# Patient Record
Sex: MALE | Race: WHITE | URBAN - METROPOLITAN AREA
[De-identification: names, ages, dates, MRNs, and addresses within clinical notes are randomized per-mention and may not be internally consistent; named-entity substitution may affect disease eponyms.]

---

## 2017-02-09 ENCOUNTER — TELEPHONE (OUTPATIENT)
Dept: PALLIATIVE MEDICINE | Facility: CLINIC | Age: 47
End: 2017-02-09

## 2017-02-09 NOTE — TELEPHONE ENCOUNTER
Nurse Line call 2/9/17    Patient would like a nurse to call him back related to an insurance issue. [phone]. Called him back. No answer. Will try again later.    AIME TerryN, RN  Care Coordinator  [facility] [place]

## 2017-02-13 NOTE — TELEPHONE ENCOUNTER
"Called and spoke to pt. He stated that he had been in pain for multiple years and he wanted to be \"out of pain\" and he wanted to know if we could do that or not. Explained to him that we had tried to call him in November and December and offered him the facet joint injection vs the repeat RFA.     Explained that since he had not seen provider since 11/2/2016 he really had to come in for a proper evaluation. Offered pt an appt within 2 weeks and he denied appt till 4/19/2017.     Will send to provider for FYI only. And close encounter.     Chika Florentino, RN, [facility]  [facility] Clinic Care Coordinator   "

## 2017-03-25 ENCOUNTER — APPOINTMENT (OUTPATIENT)
Dept: GENERAL RADIOLOGY | Facility: CLINIC | Age: 47
DRG: 379 | End: 2017-03-25
Attending: EMERGENCY MEDICINE
Payer: COMMERCIAL

## 2017-03-25 ENCOUNTER — HOSPITAL ENCOUNTER (INPATIENT)
Facility: CLINIC | Age: 47
LOS: 1 days | Discharge: HOME | DRG: 379 | End: 2017-03-27
Attending: EMERGENCY MEDICINE | Admitting: FAMILY MEDICINE
Payer: COMMERCIAL

## 2017-03-25 DIAGNOSIS — D50.0 IRON DEFICIENCY ANEMIA DUE TO CHRONIC BLOOD LOSS: ICD-10-CM

## 2017-03-25 DIAGNOSIS — G89.29 CHRONIC LOW BACK PAIN WITHOUT SCIATICA, UNSPECIFIED BACK PAIN LATERALITY: Chronic | ICD-10-CM

## 2017-03-25 DIAGNOSIS — R42 LIGHTHEADEDNESS: ICD-10-CM

## 2017-03-25 DIAGNOSIS — M62.81 MUSCLE WEAKNESS (GENERALIZED): ICD-10-CM

## 2017-03-25 DIAGNOSIS — D64.9 ANEMIA, UNSPECIFIED TYPE: ICD-10-CM

## 2017-03-25 DIAGNOSIS — M54.50 CHRONIC LOW BACK PAIN WITHOUT SCIATICA, UNSPECIFIED BACK PAIN LATERALITY: Chronic | ICD-10-CM

## 2017-03-25 DIAGNOSIS — K21.00 GASTROESOPHAGEAL REFLUX DISEASE WITH ESOPHAGITIS: Primary | ICD-10-CM

## 2017-03-25 DIAGNOSIS — D64.9 ANEMIA: ICD-10-CM

## 2017-03-25 PROBLEM — E87.6 HYPOKALEMIA: Status: ACTIVE | Noted: 2017-03-25

## 2017-03-25 PROBLEM — R07.9 ACUTE CHEST PAIN: Status: ACTIVE | Noted: 2017-03-25

## 2017-03-25 LAB
ALBUMIN SERPL-MCNC: 3.7 G/DL (ref 3.4–5)
ALBUMIN UR-MCNC: NEGATIVE MG/DL
ALP SERPL-CCNC: 79 U/L (ref 40–150)
ALT SERPL W P-5'-P-CCNC: 19 U/L (ref 0–70)
ANION GAP SERPL CALCULATED.3IONS-SCNC: 8 MMOL/L (ref 3–14)
APPEARANCE UR: CLEAR
AST SERPL W P-5'-P-CCNC: 10 U/L (ref 0–45)
BASOPHILS # BLD AUTO: 0.1 10E9/L (ref 0–0.2)
BASOPHILS NFR BLD AUTO: 1 %
BILIRUB SERPL-MCNC: 1.2 MG/DL (ref 0.2–1.3)
BILIRUB UR QL STRIP: NEGATIVE
BLD PROD TYP BPU: NORMAL
BLD PROD TYP BPU: NORMAL
BLD UNIT ID BPU: 0
BLD UNIT ID BPU: 0
BLOOD PRODUCT CODE: NORMAL
BLOOD PRODUCT CODE: NORMAL
BPU ID: NORMAL
BPU ID: NORMAL
BUN SERPL-MCNC: 9 MG/DL (ref 7–30)
CALCIUM SERPL-MCNC: 8.1 MG/DL (ref 8.5–10.1)
CHLORIDE SERPL-SCNC: 109 MMOL/L (ref 94–109)
CO2 SERPL-SCNC: 24 MMOL/L (ref 20–32)
COLOR UR AUTO: YELLOW
CREAT SERPL-MCNC: 0.99 MG/DL (ref 0.66–1.25)
DIFFERENTIAL METHOD BLD: ABNORMAL
EOSINOPHIL # BLD AUTO: 0 10E9/L (ref 0–0.7)
EOSINOPHIL NFR BLD AUTO: 0.3 %
ERYTHROCYTE [DISTWIDTH] IN BLOOD BY AUTOMATED COUNT: 16.5 % (ref 10–15)
GFR SERPL CREATININE-BSD FRML MDRD: 81 ML/MIN/1.7M2
GLUCOSE SERPL-MCNC: 73 MG/DL (ref 70–99)
GLUCOSE UR STRIP-MCNC: NEGATIVE MG/DL
HCT VFR BLD AUTO: 24.3 % (ref 40–53)
HGB BLD-MCNC: 6.7 G/DL (ref 13.3–17.7)
HGB BLD-MCNC: 6.8 G/DL (ref 13.3–17.7)
HGB UR QL STRIP: NEGATIVE
IMM GRANULOCYTES # BLD: 0 10E9/L (ref 0–0.4)
IMM GRANULOCYTES NFR BLD: 0.3 %
KETONES UR STRIP-MCNC: NEGATIVE MG/DL
LACTATE BLD-SCNC: 1.5 MMOL/L (ref 0.7–2.1)
LEUKOCYTE ESTERASE UR QL STRIP: NEGATIVE
LIPASE SERPL-CCNC: 94 U/L (ref 73–393)
LYMPHOCYTES # BLD AUTO: 0.8 10E9/L (ref 0.8–5.3)
LYMPHOCYTES NFR BLD AUTO: 13.6 %
MCH RBC QN AUTO: 18.2 PG (ref 26.5–33)
MCHC RBC AUTO-ENTMCNC: 28 G/DL (ref 31.5–36.5)
MCV RBC AUTO: 65 FL (ref 78–100)
MONOCYTES # BLD AUTO: 0.5 10E9/L (ref 0–1.3)
MONOCYTES NFR BLD AUTO: 8 %
NEUTROPHILS # BLD AUTO: 4.4 10E9/L (ref 1.6–8.3)
NEUTROPHILS NFR BLD AUTO: 76.8 %
NITRATE UR QL: NEGATIVE
PH UR STRIP: 7 PH (ref 5–7)
PLATELET # BLD AUTO: 310 10E9/L (ref 150–450)
POTASSIUM SERPL-SCNC: 3.2 MMOL/L (ref 3.4–5.3)
PROT SERPL-MCNC: 6.9 G/DL (ref 6.8–8.8)
RBC # BLD AUTO: 3.73 10E12/L (ref 4.4–5.9)
SODIUM SERPL-SCNC: 141 MMOL/L (ref 133–144)
SP GR UR STRIP: 1.01 (ref 1–1.03)
TRANSFUSION STATUS PATIENT QL: NORMAL
TROPONIN I SERPL-MCNC: NORMAL UG/L (ref 0–0.04)
URN SPEC COLLECT METH UR: NORMAL
UROBILINOGEN UR STRIP-MCNC: NORMAL MG/DL (ref 0–2)
WBC # BLD AUTO: 5.7 10E9/L (ref 4–11)

## 2017-03-25 PROCEDURE — 36430 TRANSFUSION BLD/BLD COMPNT: CPT

## 2017-03-25 PROCEDURE — 96360 HYDRATION IV INFUSION INIT: CPT

## 2017-03-25 PROCEDURE — 99220 ZZC INITIAL OBSERVATION CARE,LEVL III: CPT | Performed by: FAMILY MEDICINE

## 2017-03-25 PROCEDURE — 99285 EMERGENCY DEPT VISIT HI MDM: CPT | Mod: 25 | Performed by: EMERGENCY MEDICINE

## 2017-03-25 PROCEDURE — 86923 COMPATIBILITY TEST ELECTRIC: CPT | Performed by: EMERGENCY MEDICINE

## 2017-03-25 PROCEDURE — P9016 RBC LEUKOCYTES REDUCED: HCPCS | Performed by: EMERGENCY MEDICINE

## 2017-03-25 PROCEDURE — 80053 COMPREHEN METABOLIC PANEL: CPT | Performed by: FAMILY MEDICINE

## 2017-03-25 PROCEDURE — 85025 COMPLETE CBC W/AUTO DIFF WBC: CPT | Performed by: FAMILY MEDICINE

## 2017-03-25 PROCEDURE — 96361 HYDRATE IV INFUSION ADD-ON: CPT

## 2017-03-25 PROCEDURE — 86901 BLOOD TYPING SEROLOGIC RH(D): CPT | Performed by: EMERGENCY MEDICINE

## 2017-03-25 PROCEDURE — 25000132 ZZH RX MED GY IP 250 OP 250 PS 637: Performed by: EMERGENCY MEDICINE

## 2017-03-25 PROCEDURE — 85018 HEMOGLOBIN: CPT | Performed by: EMERGENCY MEDICINE

## 2017-03-25 PROCEDURE — 83690 ASSAY OF LIPASE: CPT | Performed by: FAMILY MEDICINE

## 2017-03-25 PROCEDURE — 86900 BLOOD TYPING SEROLOGIC ABO: CPT | Performed by: EMERGENCY MEDICINE

## 2017-03-25 PROCEDURE — 99285 EMERGENCY DEPT VISIT HI MDM: CPT | Mod: 25

## 2017-03-25 PROCEDURE — 96374 THER/PROPH/DIAG INJ IV PUSH: CPT

## 2017-03-25 PROCEDURE — 25000125 ZZHC RX 250: Performed by: FAMILY MEDICINE

## 2017-03-25 PROCEDURE — 99207 ZZC CDG-MDM COMPONENT: MEETS HIGH - UP CODED: CPT | Performed by: FAMILY MEDICINE

## 2017-03-25 PROCEDURE — 93010 ELECTROCARDIOGRAM REPORT: CPT | Performed by: EMERGENCY MEDICINE

## 2017-03-25 PROCEDURE — 83605 ASSAY OF LACTIC ACID: CPT | Performed by: EMERGENCY MEDICINE

## 2017-03-25 PROCEDURE — G0378 HOSPITAL OBSERVATION PER HR: HCPCS

## 2017-03-25 PROCEDURE — 71020 XR CHEST 2 VW: CPT

## 2017-03-25 PROCEDURE — 81003 URINALYSIS AUTO W/O SCOPE: CPT | Performed by: EMERGENCY MEDICINE

## 2017-03-25 PROCEDURE — 86850 RBC ANTIBODY SCREEN: CPT | Performed by: EMERGENCY MEDICINE

## 2017-03-25 PROCEDURE — 93005 ELECTROCARDIOGRAM TRACING: CPT

## 2017-03-25 PROCEDURE — 25000132 ZZH RX MED GY IP 250 OP 250 PS 637: Performed by: FAMILY MEDICINE

## 2017-03-25 PROCEDURE — 84484 ASSAY OF TROPONIN QUANT: CPT | Performed by: FAMILY MEDICINE

## 2017-03-25 PROCEDURE — 25000128 H RX IP 250 OP 636: Performed by: EMERGENCY MEDICINE

## 2017-03-25 RX ORDER — HYDROCODONE BITARTRATE AND ACETAMINOPHEN 5; 325 MG/1; MG/1
1-2 TABLET ORAL EVERY 4 HOURS PRN
Status: DISCONTINUED | OUTPATIENT
Start: 2017-03-25 | End: 2017-03-27 | Stop reason: HOSPADM

## 2017-03-25 RX ORDER — POTASSIUM CHLORIDE 1500 MG/1
20-40 TABLET, EXTENDED RELEASE ORAL
Status: DISCONTINUED | OUTPATIENT
Start: 2017-03-25 | End: 2017-03-27 | Stop reason: HOSPADM

## 2017-03-25 RX ORDER — POTASSIUM CHLORIDE 29.8 MG/ML
20 INJECTION INTRAVENOUS
Status: DISCONTINUED | OUTPATIENT
Start: 2017-03-25 | End: 2017-03-25 | Stop reason: ALTCHOICE

## 2017-03-25 RX ORDER — SODIUM CHLORIDE 9 MG/ML
INJECTION, SOLUTION INTRAVENOUS CONTINUOUS
Status: DISCONTINUED | OUTPATIENT
Start: 2017-03-25 | End: 2017-03-26 | Stop reason: DRUGHIGH

## 2017-03-25 RX ORDER — ACETAMINOPHEN 325 MG/1
650 TABLET ORAL EVERY 6 HOURS PRN
Status: DISCONTINUED | OUTPATIENT
Start: 2017-03-25 | End: 2017-03-27 | Stop reason: HOSPADM

## 2017-03-25 RX ORDER — ONDANSETRON 4 MG/1
4 TABLET, ORALLY DISINTEGRATING ORAL EVERY 6 HOURS PRN
Status: DISCONTINUED | OUTPATIENT
Start: 2017-03-25 | End: 2017-03-27 | Stop reason: HOSPADM

## 2017-03-25 RX ORDER — HYDROMORPHONE HYDROCHLORIDE 1 MG/ML
.3-.5 INJECTION, SOLUTION INTRAMUSCULAR; INTRAVENOUS; SUBCUTANEOUS
Status: DISCONTINUED | OUTPATIENT
Start: 2017-03-25 | End: 2017-03-27 | Stop reason: HOSPADM

## 2017-03-25 RX ORDER — SODIUM CHLORIDE 9 MG/ML
INJECTION, SOLUTION INTRAVENOUS CONTINUOUS
Status: DISCONTINUED | OUTPATIENT
Start: 2017-03-25 | End: 2017-03-27 | Stop reason: HOSPADM

## 2017-03-25 RX ORDER — POTASSIUM CHLORIDE 7.45 MG/ML
10 INJECTION INTRAVENOUS
Status: DISCONTINUED | OUTPATIENT
Start: 2017-03-25 | End: 2017-03-27 | Stop reason: HOSPADM

## 2017-03-25 RX ORDER — POTASSIUM CHLORIDE 1.5 G/1.58G
20-40 POWDER, FOR SOLUTION ORAL
Status: DISCONTINUED | OUTPATIENT
Start: 2017-03-25 | End: 2017-03-27 | Stop reason: HOSPADM

## 2017-03-25 RX ORDER — ACETAMINOPHEN 325 MG/1
650 TABLET ORAL EVERY 4 HOURS PRN
Status: DISCONTINUED | OUTPATIENT
Start: 2017-03-25 | End: 2017-03-25

## 2017-03-25 RX ORDER — ONDANSETRON 2 MG/ML
4 INJECTION INTRAMUSCULAR; INTRAVENOUS EVERY 6 HOURS PRN
Status: DISCONTINUED | OUTPATIENT
Start: 2017-03-25 | End: 2017-03-27 | Stop reason: HOSPADM

## 2017-03-25 RX ORDER — NALOXONE HYDROCHLORIDE 0.4 MG/ML
.1-.4 INJECTION, SOLUTION INTRAMUSCULAR; INTRAVENOUS; SUBCUTANEOUS
Status: DISCONTINUED | OUTPATIENT
Start: 2017-03-25 | End: 2017-03-27 | Stop reason: HOSPADM

## 2017-03-25 RX ADMIN — POTASSIUM CHLORIDE 40 MEQ: 1.5 POWDER, FOR SOLUTION ORAL at 18:02

## 2017-03-25 RX ADMIN — SODIUM CHLORIDE: 9 INJECTION, SOLUTION INTRAVENOUS at 14:09

## 2017-03-25 RX ADMIN — PANTOPRAZOLE SODIUM 40 MG: 40 INJECTION, POWDER, FOR SOLUTION INTRAVENOUS at 17:59

## 2017-03-25 RX ADMIN — SODIUM CHLORIDE: 9 INJECTION, SOLUTION INTRAVENOUS at 21:03

## 2017-03-25 RX ADMIN — HYDROCODONE BITARTRATE AND ACETAMINOPHEN 1 TABLET: 5; 325 TABLET ORAL at 21:08

## 2017-03-25 RX ADMIN — POTASSIUM CHLORIDE 40 MEQ: 1.5 POWDER, FOR SOLUTION ORAL at 20:15

## 2017-03-25 ASSESSMENT — ENCOUNTER SYMPTOMS
SHORTNESS OF BREATH: 0
LIGHT-HEADEDNESS: 1
NECK PAIN: 0
ADENOPATHY: 0
HEADACHES: 0
COUGH: 0
NUMBNESS: 0
CONFUSION: 0
NAUSEA: 0
FATIGUE: 1
DYSURIA: 0
DIZZINESS: 1
DIARRHEA: 0
BLOOD IN STOOL: 0
ACTIVITY CHANGE: 1
CHILLS: 0
BACK PAIN: 0
VOMITING: 0
WEAKNESS: 0
PALPITATIONS: 0

## 2017-03-25 NOTE — ED NOTES
Pt reports chest pain for years.   Current complaints dizziness, sob, chest pain and yellowing of skin.   Pt reports has not seen a doctor in a couple of years.  Pt had a blood test at work last year and was told to follow up with doctor regarding a blood test but did not follow up.

## 2017-03-25 NOTE — IP AVS SNAPSHOT
[facility]    [address] [address]    Phone:  [phone]    Fax:  [phone]                                       After Visit Summary   3/25/2017    Mando Houston    MRN: [id number]           After Visit Summary Signature Page     I have received my discharge instructions, and my questions have been answered. I have discussed any challenges I see with this plan with the nurse or doctor.    ..........................................................................................................................................  Patient/Patient Representative Signature      ..........................................................................................................................................  Patient Representative Print Name and Relationship to Patient    ..................................................               ................................................  Date                                            Time    ..........................................................................................................................................  Reviewed by Signature/Title    ...................................................              ..............................................  Date                                                            Time

## 2017-03-25 NOTE — LETTER
[facility]    Return to Work Release    Date: 3/27/2017      Name: Mando Houston                       YOB: 1970  Soc.Sec.No: [id number]  Medical Record Number: [id number]    The patient was admitted to [facility].    Restrictions if any: none    Resume Activity: OK to return to work 3/28/2017        __________Radha Nino MD  Hospitalist_______________

## 2017-03-25 NOTE — H&P
[facility] HISTORY & PHYSICAL  Home Clinic:   [facility]  Primary Provider:   unknown  Date of admission:   3/25/2017    ASSESSMENT AND PLAN:    Principal Problem:    Iron deficiency anemia    Assessment: hgb 6.8, probable upper GI bleed chronic and intermittent.  Complains of frequent bloody emesis with acidic and fatty foods usually about 3 in the morning.  Does not take a proton pump inhibitor.  Uses Ibuprofen often for chronic back pain, has recently switched to tylenol.  One or two beer a week, no tobacco or caffeine.  Potassium is also low and will correct per standing orders.    Plan: transfuse 2 units of PRBC, goal hgb above 8.0, may try for 9.0 if still lightheaded or chest pain.    Active Problems:    Acute chest pain    Lightheadedness    Assessment: intermittent, related to anemia, troponin and EKG are normal.     Plan: as listed above      Gastroesophageal reflux disease with esophagitis    Assessment: probable cause of anemia, expect peptic ulcer or other as source of bleed and anemia.    Plan: start on PPI and stop ibuprofen. Plan outpatient gastroscopy.       Hypokalemia     Assessment:  Potassium in 3.2      Plan:  Potassium replacement per standing orders.  Disposition: Anticipate will be hospitalized 24 hours.   Code Status: CPR  GI Prophylaxis: Protonix 40 mg IVPB daily  DVT Prophylaxis: VTE Prophylaxis contraindicated due to GI bleed      CHIEF COMPLAINT:  Lightheaded, chest pain  History Obtained From:  patient, electronic health record, emergency department physician and patient's spouse    HISTORY OF PRESENT ILLNESS:    Mando Houston is a 47 year old white male with significant past medical history of GERD, anemia, chronic low back pain, obstructive sleep apnea  who presents with fatigue and dizziness for about a month after having a URI.  Light activity and work have been more difficult because of weakness.  He has had intermittent chest pain, frequent hematemesis and heartburn.  He  does not take medication for this, does use considerable amount of ibuprofen for low back pain.  No blood in stool or urine, does not bruise easily.  He has history of alcohol use but has not had drink for at least 2 months.  Hgb noted to be 6.8 and blood transfusion started in ED.    REVIEW OF SYSTEMS:  CONSTITUTIONAL:  positive for  fatigue and malaise  negative for  fevers, chills, sweats and weight loss  HEENT:  negative for  earaches, sore mouth, sore throat and hoarseness  RESPIRATORY:  negative for  dry cough, cough with sputum, dyspnea, wheezing, hemoptysis, chest pain, pleuritic pain and cyanosis  CARDIOVASCULAR:  positive for  chest pain, fatigue  negative for  palpitations, orthopnea, exertional chest pressure/discomfort, edema, syncope  GASTROINTESTINAL:  positive for vomiting, reflux and hematemesis  negative for change in bowel habits, diarrhea, constipation, abdominal pain, regurgitation and hemtochezia  GENITOURINARY:  negative for frequency, dysuria, nocturia, urinary incontinence, hesitancy, decreased stream and hematuria  MUSCULOSKELETAL:  positive for  myalgias and arthralgias  negative for  joint swelling, stiff joints, muscle weakness and bone pain  NEUROLOGICAL:  positive for dizziness and memory problems  negative for headaches, speech problems, visual disturbance, coordination problems, gait problems, tremor, weakness and numbness  BEHAVIOR/PSYCH:  negative for poor appetite, increased appetite, decreased sleep, increased sleep, decreased energy level, increased energy level, depressed mood and elated mood    PAST MEDICAL HISTORY:    Medications Prior to Admission:    Prescriptions Prior to Admission   Medication Sig Dispense Refill Last Dose     Acetaminophen (TYLENOL PO) Take 650 mg by mouth every 6 hours as needed for mild pain or fever   3/25/2017 at am     ASPIRIN NOT PRESCRIBED, INTENTIONAL, 1 each continuous prn for other Antiplatelet medication not prescribed intentionally due to GI  Issues / Ulcer Disease 0 each 0 Taking     ORDER FOR DME, SET TO LOCAL PRINT, Respironics REMSTAR 60 Series AutoCPAP 8-16cm H2O, Mirage Fx standard nasal mask   More than a month at Unknown time       Allergies:  No Known Allergies  Patient Active Problem List   Diagnosis     CARDIOVASCULAR SCREENING; LDL GOAL LESS THAN 160     Chronic low back pain     Esophageal reflux     Grief reaction     Alcohol abuse     Iron deficiency anemia     Chronic neck pain     Obesity     AMELIE (obstructive sleep apnea)- severe ()     Acute chest pain     Lightheadedness     Gastroesophageal reflux disease with esophagitis     Hypokalemia     Past Medical History:   Diagnosis Date     AR (allergic rhinitis)     CONJUNCTIVITIS     MEDICAL HISTORY OF -     Hx OF BROCHOSPASM     Past Surgical History:    Past Surgical History:   Procedure Laterality Date     PE TUBES  1970s    as child     SURGICAL HISTORY OF -   1990s    LIPOMA, CHEST     TONSILLECTOMY  1980       Social History:   Social History     Social History     Marital status: Single     Spouse name: N/A     Number of children: N/A     Years of education: N/A     Occupational History      [employer]     Social History Main Topics     Smoking status: Never Smoker     Smokeless tobacco: Never Used      Comment: non-smoking household     Alcohol use Yes      Comment: rare as of 01/05/15     Drug use: No     Sexual activity: Yes     Partners: Female     Other Topics Concern     Parent/Sibling W/ Cabg, Mi Or Angioplasty Before 65f 55m? No     Social History Narrative     Family History:   Family History   Problem Relation Age of Onset     HEART DISEASE Mother      CHF     Unknown/Adopted Father      Unknown/Adopted Maternal Grandmother      Unknown/Adopted Maternal Grandfather      Unknown/Adopted Paternal Grandmother      Unknown/Adopted Paternal Grandfather      DIABETES No family hx of      Coronary Artery Disease No family hx of      Cancer -  "colorectal No family hx of      Family Status   Relation Status     Mother      Father Alive     Maternal Grandmother      Maternal Grandfather      Paternal Grandmother      Paternal Grandfather      Brother Alive     Sister Alive     No family hx of          PHYSICAL EXAM:  Vitals:   /73 (BP Location: Left arm)  Pulse 75  Temp 97.5  F (36.4  C) (Oral)  Resp 16  Ht 1.753 m (5' 9\")  Wt 106.3 kg (234 lb 5.6 oz)  SpO2 100%  BMI 34.61 kg/m2   GENERAL APPEARANCE ADULT: alert, fit appearing, appears ill, no distress, cooperative, pale, sallow complexion, tired appearing, wife present in room  HENT: Ears and TMs normal, oral mucosa and posterior oropharynx normal  RESP: lungs clear to auscultation   CV: normal rate, regular rhythm, no murmur or gallop  ABDOMEN: soft, no organomegaly, masses or tenderness  SKIN: no suspicious lesions or rashes  NEURO: Alert, oriented, speech and mentation normal  PSYCH: mentation appears normal., affect and mood normal    ECG:   Normal Sinus Rhythm, rate 76, normal axis, normal intervals, no acute ST/T changes c/w ischemia    LABS AND IMAGING:     Results for orders placed or performed during the hospital encounter of 17   Chest XR,  PA & LAT    Narrative    CHEST TWO VIEWS 2017 1:18 PM     HISTORY: Shortness of breath.    COMPARISON: Chest x-rays dated 1/15/2015.    FINDINGS: The lungs are clear. No pleural effusions or pneumothorax.  Heart size and pulmonary vascularity are within normal limits. No  acute fracture.       Impression    IMPRESSION: No evidence of acute cardiopulmonary disease is seen.    LETICIA RUCKER MD   CBC with platelets differential   Result Value Ref Range    WBC 5.7 4.0 - 11.0 10e9/L    RBC Count 3.73 (L) 4.4 - 5.9 10e12/L    Hemoglobin 6.8 (LL) 13.3 - 17.7 g/dL    Hematocrit 24.3 (L) 40.0 - 53.0 %    MCV 65 (L) 78 - 100 fl    MCH 18.2 (L) 26.5 - 33.0 pg    MCHC 28.0 (L) 31.5 - 36.5 g/dL    RDW " 16.5 (H) 10.0 - 15.0 %    Platelet Count 310 150 - 450 10e9/L    Diff Method Automated Method     % Neutrophils 76.8 %    % Lymphocytes 13.6 %    % Monocytes 8.0 %    % Eosinophils 0.3 %    % Basophils 1.0 %    % Immature Granulocytes 0.3 %    Absolute Neutrophil 4.4 1.6 - 8.3 10e9/L    Absolute Lymphocytes 0.8 0.8 - 5.3 10e9/L    Absolute Monocytes 0.5 0.0 - 1.3 10e9/L    Absolute Eosinophils 0.0 0.0 - 0.7 10e9/L    Absolute Basophils 0.1 0.0 - 0.2 10e9/L    Abs Immature Granulocytes 0.0 0 - 0.4 10e9/L   Comprehensive metabolic panel   Result Value Ref Range    Sodium 141 133 - 144 mmol/L    Potassium 3.2 (L) 3.4 - 5.3 mmol/L    Chloride 109 94 - 109 mmol/L    Carbon Dioxide 24 20 - 32 mmol/L    Anion Gap 8 3 - 14 mmol/L    Glucose 73 70 - 99 mg/dL    Urea Nitrogen 9 7 - 30 mg/dL    Creatinine 0.99 0.66 - 1.25 mg/dL    GFR Estimate 81 >60 mL/min/1.7m2    GFR Estimate If Black >90   GFR Calc   >60 mL/min/1.7m2    Calcium 8.1 (L) 8.5 - 10.1 mg/dL    Bilirubin Total 1.2 0.2 - 1.3 mg/dL    Albumin 3.7 3.4 - 5.0 g/dL    Protein Total 6.9 6.8 - 8.8 g/dL    Alkaline Phosphatase 79 40 - 150 U/L    ALT 19 0 - 70 U/L    AST 10 0 - 45 U/L   Troponin I   Result Value Ref Range    Troponin I ES  0.000 - 0.045 ug/L     <0.015  The 99th percentile for upper reference range is 0.045 ug/L.  Troponin values in   the range of 0.045 - 0.120 ug/L may be associated with risks of adverse   clinical events.     Hemaglobin   Result Value Ref Range    Hemoglobin 6.7 (LL) 13.3 - 17.7 g/dL   Lactic acid whole blood   Result Value Ref Range    Lactic Acid 1.5 0.7 - 2.1 mmol/L   UA reflex to Microscopic   Result Value Ref Range    Color Urine Yellow     Appearance Urine Clear     Glucose Urine Negative NEG mg/dL    Bilirubin Urine Negative NEG    Ketones Urine Negative NEG mg/dL    Specific Gravity Urine 1.009 1.003 - 1.035    Blood Urine Negative NEG    pH Urine 7.0 5.0 - 7.0 pH    Protein Albumin Urine Negative NEG mg/dL     Urobilinogen mg/dL Normal 0.0 - 2.0 mg/dL    Nitrite Urine Negative NEG    Leukocyte Esterase Urine Negative NEG    Source Midstream Urine    Lipase   Result Value Ref Range    Lipase 94 73 - 393 U/L   ABO/Rh type and screen   Result Value Ref Range    Units Ordered 2     ABO A     RH(D)  Pos     Antibody Screen Neg     Test Valid Only At [facility]     Specimen Expires 03/28/2017     Crossmatch Red Blood Cells    Blood component   Result Value Ref Range    Unit Number [id number]     Blood Component Type Red Blood Cells Leukocyte Reduced     Division Number 00     Status of Unit Released to care unit 03/25/2017 1506     Blood Product Code [id number]     Unit Status ISS    Blood component   Result Value Ref Range    Unit Number [id number]     Blood Component Type Red Blood Cells Leukocyte Reduced     Division Number 00     Status of Unit Ready for patient 03/25/2017 1401     Blood Product Code [id number]     Unit Status URI        ATTESTATION:  I have reviewed today's vital signs, notes, medications, labs and imaging.  Amount of time performed on this history and physical: 70 minutes.    ABRAHAN MCCRARY MD

## 2017-03-25 NOTE — ED PROVIDER NOTES
History     Chief Complaint   Patient presents with     Chest Pain     CP OFF AND ON X 3 DAYS - LIGHTHEADED AND DIZZY TODAY     HPI  Mando Houston is a 47 year old male who has a history of chronic low back pain, GERD, AMELIE, anemia, and alcohol abuse who is here in the ED for evaluation of chest pain. Patient states that he has been feeling very dizzy and fatigued in the last month, all starting after he had a mild cold. He states that he will get worn out at work rather easy, and he can become very light headed with mild activity. Patient also states that he has been having intermittent bilateral chest pains that are irregular in length and can be characterized as sharp or dull. Patient did have a health check up at work last fall that showed that his hemoglobin was on the lower side, but he has not been seen by the doctor for it. He has not noticed his stools being bloody or dark in recent months. Patient has been taking pain medications for his chronic back pain, but he is on no other medications. He denies cough, nausea, vomiting, diarrhea, dark or bloody stools, and recent alcohol abuse.     Patient Active Problem List   Diagnosis     CARDIOVASCULAR SCREENING; LDL GOAL LESS THAN 160     Chronic low back pain     Esophageal reflux     Grief reaction     Alcohol abuse     Iron deficiency anemia     Chronic neck pain     Obesity     AMELIE (obstructive sleep apnea)- severe ()     Current Outpatient Prescriptions   Medication Sig Dispense Refill     Acetaminophen (TYLENOL PO) Take 650 mg by mouth every 6 hours as needed for mild pain or fever       ASPIRIN NOT PRESCRIBED, INTENTIONAL, 1 each continuous prn for other Antiplatelet medication not prescribed intentionally due to GI Issues / Ulcer Disease 0 each 0     ORDER FOR DME, SET TO LOCAL PRINT, Respironics REMSTAR 60 Series AutoCPAP 8-16cm H2O, Mirage Fx standard nasal mask       No Known Allergies    I have reviewed the Medications, Allergies, Past Medical  and Surgical History, and Social History in the Epic system.    Review of Systems   Constitutional: Positive for activity change and fatigue. Negative for chills.   HENT: Negative for congestion.    Eyes: Negative for visual disturbance.   Respiratory: Negative for cough and shortness of breath.    Cardiovascular: Positive for chest pain. Negative for palpitations and leg swelling.   Gastrointestinal: Negative for blood in stool, diarrhea, nausea and vomiting.   Genitourinary: Negative for decreased urine volume and dysuria.   Musculoskeletal: Negative for back pain and neck pain.   Skin: Positive for pallor. Negative for rash.   Neurological: Positive for dizziness and light-headedness. Negative for weakness, numbness and headaches.   Hematological: Negative for adenopathy.   Psychiatric/Behavioral: Negative for confusion.       Physical Exam   BP: 137/85  Heart Rate: 95  Temp: 98  F (36.7  C)  Resp: 18  Weight: 106.6 kg (235 lb)  SpO2: 99 %  Physical Exam  HENT: Oral mucosa moist. No lesions.  Neck: Supple  Pulmonary/Chest: Lungs are clear to auscultation bilaterally.  Cardiovascular: Heart is regular rate and rhythm. No murmur.  Abdomen: Soft, non-distended, non-tender.   Musculoskeletal: Moving all extremities well. No peripheral edema. No calf tenderness.   Neurological: Alert. No focal neurologic deficit.   Skin: No rash. Pale.     ED Course     ED Course     Procedures             EKG Interpretation:      Interpreted by Ok Steele  Rhythm: normal sinus   Rate: normal  Axis: normal  Ectopy: none  Conduction: normal  ST Segments/ T Waves: No ST-T wave changes  Q Waves: none  Comparison to prior: Unchanged from 1/5/15    Clinical Impression: normal EKG          Critical Care time:  none               Labs Ordered and Resulted from Time of ED Arrival Up to the Time of Departure from the ED   CBC WITH PLATELETS DIFFERENTIAL   COMPREHENSIVE METABOLIC PANEL   TROPONIN I     Results for orders placed or  performed during the hospital encounter of 03/25/17 (from the past 24 hour(s))   CBC with platelets differential   Result Value Ref Range    WBC 5.7 4.0 - 11.0 10e9/L    RBC Count 3.73 (L) 4.4 - 5.9 10e12/L    Hemoglobin 6.8 (LL) 13.3 - 17.7 g/dL    Hematocrit 24.3 (L) 40.0 - 53.0 %    MCV 65 (L) 78 - 100 fl    MCH 18.2 (L) 26.5 - 33.0 pg    MCHC 28.0 (L) 31.5 - 36.5 g/dL    RDW 16.5 (H) 10.0 - 15.0 %    Platelet Count 310 150 - 450 10e9/L    Diff Method Automated Method     % Neutrophils 76.8 %    % Lymphocytes 13.6 %    % Monocytes 8.0 %    % Eosinophils 0.3 %    % Basophils 1.0 %    % Immature Granulocytes 0.3 %    Absolute Neutrophil 4.4 1.6 - 8.3 10e9/L    Absolute Lymphocytes 0.8 0.8 - 5.3 10e9/L    Absolute Monocytes 0.5 0.0 - 1.3 10e9/L    Absolute Eosinophils 0.0 0.0 - 0.7 10e9/L    Absolute Basophils 0.1 0.0 - 0.2 10e9/L    Abs Immature Granulocytes 0.0 0 - 0.4 10e9/L   Comprehensive metabolic panel   Result Value Ref Range    Sodium 141 133 - 144 mmol/L    Potassium 3.2 (L) 3.4 - 5.3 mmol/L    Chloride 109 94 - 109 mmol/L    Carbon Dioxide 24 20 - 32 mmol/L    Anion Gap 8 3 - 14 mmol/L    Glucose 73 70 - 99 mg/dL    Urea Nitrogen 9 7 - 30 mg/dL    Creatinine 0.99 0.66 - 1.25 mg/dL    GFR Estimate 81 >60 mL/min/1.7m2    GFR Estimate If Black >90   GFR Calc   >60 mL/min/1.7m2    Calcium 8.1 (L) 8.5 - 10.1 mg/dL    Bilirubin Total 1.2 0.2 - 1.3 mg/dL    Albumin 3.7 3.4 - 5.0 g/dL    Protein Total 6.9 6.8 - 8.8 g/dL    Alkaline Phosphatase 79 40 - 150 U/L    ALT 19 0 - 70 U/L    AST 10 0 - 45 U/L   Troponin I   Result Value Ref Range    Troponin I ES  0.000 - 0.045 ug/L     <0.015  The 99th percentile for upper reference range is 0.045 ug/L.  Troponin values in   the range of 0.045 - 0.120 ug/L may be associated with risks of adverse   clinical events.         Medications   0.9% sodium chloride infusion (not administered)     Results for orders placed or performed during the hospital encounter  of 03/25/17   Chest XR,  PA & LAT    Narrative    CHEST TWO VIEWS March 25, 2017 1:18 PM     HISTORY: Shortness of breath.    COMPARISON: Chest x-rays dated 1/15/2015.    FINDINGS: The lungs are clear. No pleural effusions or pneumothorax.  Heart size and pulmonary vascularity are within normal limits. No  acute fracture.       Impression    IMPRESSION: No evidence of acute cardiopulmonary disease is seen.    LETICIA RUCKER MD       12:35 PM Patient assessed.   Assessments & Plan (with Medical Decision Making) records were reviewed.  Labs EKG and chest x-ray were obtained.  Patient's white count was not elevated but hemoglobin was 6.8 with a MCV of low at 65 MCHC was also low.  Platelet count was 310.  There was no left shift.  Comprehensive metabolic panel significant for a potassium at 3.2.  His troponin was negative.  Lactic acid 1.5 .  A repeat hemoglobin was checked and was 6.7.  Patient's last hemoglobin 2 years ago was 11.4.  He denies any dark stools.  I did a rectal exam which had minimal stool in the vault but was heme negative.  No dark stool was noted.  Chest x-ray was unremarkable.  Due to patient's generalized weakness chest pain or shortness of breath or feel his anemia is symptomatic and patient will benefit from transfusion.  I discussed with him and he is in agreement with this.  He will be admitted for transfusion.  Patient has complained of some reflux symptoms also and may have peptic ulcer disease causing some of his anemia.  Patient may need upper endoscopy.  A better stool sample rectally for blood may be needed.  Patient had been informed of findings and plan throughout hisstay in the emergency department and is agreement with admission.  Blood products were started in the emergency department.       I have reviewed the nursing notes.    I have reviewed the findings, diagnosis, plan and need for follow up with the patient.    New Prescriptions    No medications on file       Final diagnoses:    Anemia   Muscle weakness (generalized)   Lightheadedness   Chronic low back pain without sciatica, unspecified back pain laterality     This document serves as a record of the services and decisions personally performed and made by Ok Steele MD. It was created on HIS/HER behalf by   Berenice Bruce, a trained medical scribe. The creation of this document is based the provider's statements to the medical scribe.  Berenice Bruce 12:35 PM 3/25/2017    Provider:   The information in this document, created by the medical scribe for me, accurately reflects the services I personally performed and the decisions made by me. I have reviewed and approved this document for accuracy prior to leaving the patient care area.  Ok Steele MD 12:35 PM 3/25/2017    3/25/2017   [facility] EMERGENCY DEPARTMENT     Ok Steele MD  03/26/17 1248

## 2017-03-25 NOTE — PROGRESS NOTES
[facility] ADMISSION NOTE    Patient admitted to room 2302 at approximately 1655 via wheel chair from emergency room. Patient was accompanied by significant other.     Verbal SBAR report received from Damian prior to patient arrival.     Patient ambulated to bed with stand-by assist. Patient alert and oriented X 3. Pain is controlled without any medications. 0-10 Pain Scale: 4. Admission vital signs: Blood pressure 111/66, temperature 98  F (36.7  C), temperature source Oral, resp. rate 16, weight 106.6 kg (235 lb), SpO2 99 %. Patient was oriented to plan of care, call light, bed controls, tv, telephone, bathroom and visiting hours.     The following safety risks were identified during admission: fall. Yellow risk band applied: YES.     Rose Bartlett

## 2017-03-25 NOTE — ED NOTES
Patient has  [place] to Observation  order. Patient has been given Patient Bill of Rights, Observation brochure and  What does Observation mean to me  forms.  Patient has been given the opportunity to ask questions about observation status and their plan of care.   Lily Caal

## 2017-03-25 NOTE — IP AVS SNAPSHOT
MRN:[id number]                      After Visit Summary   3/25/2017    Mando Houston    MRN: [id number]           Thank you!     Thank you for choosing [facility] for your care. Our goal is always to provide you with excellent care. Hearing back from our patients is one way we can continue to improve our services. Please take a few minutes to complete the written survey that you may receive in the mail after you visit with us. Thank you!        Patient Information     Date Of Birth          1970        About your hospital stay     You were admitted on:  March 25, 2017 You last received care in the:  [facility]    You were discharged on:  March 27, 2017        Reason for your hospital stay       Iron deficiency Anemia of unclear source, gerd                  Who to Call     For medical emergencies, please call 911.  For non-urgent questions about your medical care, please call your primary care provider or clinic, [phone]  For questions related to your surgery, please call your surgery clinic        Attending Provider     Provider Specialty    Ok Steele MD Emergency Medicine    [facility] ([place], [place]), Vasile Chavez MD Internal Medicine    Osmel, Debbie Lyle MD [facility]    Dixie Carvalho MD [facility]    Radha Nino MD Internal Medicine       Primary Care Provider Office Phone #    [facility] [phone]       No address on file        Follow-up Appointments     Follow Up and recommended labs and tests       Need appt with primary md 1 week and hgb at that time                  Your next 10 appointments already scheduled     Apr 19, 2017  4:00 PM CDT   Return Visit with ARIELLA Crespo [facility] [place] ([facility])    [address]  [place] [id number] [phone]              Pending Results     Date and Time Order Name Status Description    3/27/2017 0948 Surgical pathology exam In  "process             Statement of Approval     Ordered          17 1252  I have reviewed and agree with all the recommendations and orders detailed in this document.  EFFECTIVE NOW     Approved and electronically signed by:  Radha Nino MD             Admission Information     Date & Time Provider Department Dept. Phone    3/25/2017 Radha Nino MD [facility] Surgical [phone]      Your Vitals Were     Blood Pressure Pulse Temperature Respirations Height Weight    139/73 (BP Location: Right arm) 64 97.7  F (36.5  C) (Oral) 18 1.753 m (5' 9\") 106.3 kg (234 lb 5.6 oz)    Pulse Oximetry BMI (Body Mass Index)                96% 34.61 kg/m2          MyChart Information     [facility] lets you send messages to your doctor, view your test results, renew your prescriptions, schedule appointments and more. To sign up, go to [email or web address] . Click on \"Log in\" on the left side of the screen, which will take you to the Welcome page. Then click on \"Sign up Now\" on the right side of the page.     You will be asked to enter the access code listed below, as well as some personal information. Please follow the directions to create your username and password.     Your access code is: [email or web address]  Expires: 2017  4:29 PM     Your access code will  in 90 days. If you need help or a new code, please call your [place] clinic or [phone].        Care EveryWhere ID     This is your Care EveryWhere ID. This could be used by other organizations to access your [place] medical records  [id number]           Review of your medicines      START taking        Dose / Directions    ferrous sulfate 325 (65 FE) MG tablet   Commonly known as:  IRON        Dose:  325 mg   Take 1 tablet (325 mg) by mouth 2 times daily   Quantity:  60 tablet   Refills:  0       pantoprazole 40 MG EC tablet   Commonly known as:  PROTONIX        Dose:  40 mg   Take 1 tablet (40 mg) by mouth daily Take 30-60 minutes before a " meal.   Quantity:  30 tablet   Refills:  0         CONTINUE these medicines which have NOT CHANGED        Dose / Directions    order for DME   Used for:  AMELIE (obstructive sleep apnea)        Respironics REMSTAR 60 Series AutoCPAP 8-16cm H2O, Mirage Fx standard nasal mask   Refills:  0       TYLENOL PO        Dose:  650 mg   Take 650 mg by mouth every 6 hours as needed for mild pain or fever   Refills:  0         STOP taking     ASPIRIN NOT PRESCRIBED   Commonly known as:  INTENTIONAL                Where to get your medicines      Some of these will need a paper prescription and others can be bought over the counter. Ask your nurse if you have questions.     Bring a paper prescription for each of these medications     ferrous sulfate 325 (65 FE) MG tablet    pantoprazole 40 MG EC tablet                Protect others around you: Learn how to safely use, store and throw away your medicines at www.disposemymeds.org.             Medication List: This is a list of all your medications and when to take them. Check marks below indicate your daily home schedule. Keep this list as a reference.      Medications           Morning Afternoon Evening Bedtime As Needed    ferrous sulfate 325 (65 FE) MG tablet   Commonly known as:  IRON   Take 1 tablet (325 mg) by mouth 2 times daily   Last time this was given:  325 mg on 3/27/2017  7:56 AM                                      order for DME   Respironics REMSTAR 60 Series AutoCPAP 8-16cm H2O, Mirage Fx standard nasal mask                                pantoprazole 40 MG EC tablet   Commonly known as:  PROTONIX   Take 1 tablet (40 mg) by mouth daily Take 30-60 minutes before a meal.                                   TYLENOL PO   Take 650 mg by mouth every 6 hours as needed for mild pain or fever

## 2017-03-25 NOTE — PROGRESS NOTES
Tolerated first unit of blood, second started at this time.  Potassium oral replacement protocol started.

## 2017-03-26 PROBLEM — K92.2 GI BLEED DUE TO NSAIDS: Status: ACTIVE | Noted: 2017-03-26

## 2017-03-26 PROBLEM — T39.395A GI BLEED DUE TO NSAIDS: Status: ACTIVE | Noted: 2017-03-26

## 2017-03-26 LAB
ABO + RH BLD: NORMAL
ABO + RH BLD: NORMAL
BASOPHILS # BLD AUTO: 0 10E9/L (ref 0–0.2)
BASOPHILS NFR BLD AUTO: 0.7 %
BLD GP AB SCN SERPL QL: NORMAL
BLD PROD TYP BPU: NORMAL
BLD PROD TYP BPU: NORMAL
BLD UNIT ID BPU: 0
BLOOD BANK CMNT PATIENT-IMP: NORMAL
BLOOD PRODUCT CODE: NORMAL
BPU ID: NORMAL
DIFFERENTIAL METHOD BLD: ABNORMAL
EOSINOPHIL # BLD AUTO: 0.1 10E9/L (ref 0–0.7)
EOSINOPHIL NFR BLD AUTO: 1.1 %
ERYTHROCYTE [DISTWIDTH] IN BLOOD BY AUTOMATED COUNT: 20 % (ref 10–15)
FERRITIN SERPL-MCNC: 2 NG/ML (ref 26–388)
HCT VFR BLD AUTO: 27.3 % (ref 40–53)
HGB BLD-MCNC: 7.9 G/DL (ref 13.3–17.7)
HGB BLD-MCNC: 9.4 G/DL (ref 13.3–17.7)
IMM GRANULOCYTES # BLD: 0 10E9/L (ref 0–0.4)
IMM GRANULOCYTES NFR BLD: 0.2 %
IRON SATN MFR SERPL: 5 % (ref 15–46)
IRON SERPL-MCNC: 19 UG/DL (ref 35–180)
LYMPHOCYTES # BLD AUTO: 1.5 10E9/L (ref 0.8–5.3)
LYMPHOCYTES NFR BLD AUTO: 26.1 %
MCH RBC QN AUTO: 20.1 PG (ref 26.5–33)
MCHC RBC AUTO-ENTMCNC: 28.9 G/DL (ref 31.5–36.5)
MCV RBC AUTO: 69 FL (ref 78–100)
MONOCYTES # BLD AUTO: 0.6 10E9/L (ref 0–1.3)
MONOCYTES NFR BLD AUTO: 11.1 %
NEUTROPHILS # BLD AUTO: 3.4 10E9/L (ref 1.6–8.3)
NEUTROPHILS NFR BLD AUTO: 60.8 %
NUM BPU REQUESTED: 3
PLATELET # BLD AUTO: 272 10E9/L (ref 150–450)
POTASSIUM SERPL-SCNC: 4 MMOL/L (ref 3.4–5.3)
RBC # BLD AUTO: 3.94 10E12/L (ref 4.4–5.9)
SPECIMEN EXP DATE BLD: NORMAL
TIBC SERPL-MCNC: 413 UG/DL (ref 240–430)
TRANSFUSION STATUS PATIENT QL: NORMAL
TRANSFUSION STATUS PATIENT QL: NORMAL
WBC # BLD AUTO: 5.6 10E9/L (ref 4–11)

## 2017-03-26 PROCEDURE — 99207 ZZC CDG-MDM COMPONENT: MEETS HIGH - UP CODED: CPT | Performed by: FAMILY MEDICINE

## 2017-03-26 PROCEDURE — 82728 ASSAY OF FERRITIN: CPT | Performed by: FAMILY MEDICINE

## 2017-03-26 PROCEDURE — 85025 COMPLETE CBC W/AUTO DIFF WBC: CPT | Performed by: EMERGENCY MEDICINE

## 2017-03-26 PROCEDURE — 12000000 ZZH R&B MED SURG/OB

## 2017-03-26 PROCEDURE — 25000132 ZZH RX MED GY IP 250 OP 250 PS 637: Performed by: FAMILY MEDICINE

## 2017-03-26 PROCEDURE — 99233 SBSQ HOSP IP/OBS HIGH 50: CPT | Performed by: FAMILY MEDICINE

## 2017-03-26 PROCEDURE — 84132 ASSAY OF SERUM POTASSIUM: CPT | Performed by: FAMILY MEDICINE

## 2017-03-26 PROCEDURE — 85018 HEMOGLOBIN: CPT | Performed by: FAMILY MEDICINE

## 2017-03-26 PROCEDURE — G0378 HOSPITAL OBSERVATION PER HR: HCPCS

## 2017-03-26 PROCEDURE — 25000125 ZZHC RX 250: Performed by: FAMILY MEDICINE

## 2017-03-26 PROCEDURE — P9016 RBC LEUKOCYTES REDUCED: HCPCS | Performed by: EMERGENCY MEDICINE

## 2017-03-26 PROCEDURE — 36415 COLL VENOUS BLD VENIPUNCTURE: CPT | Performed by: FAMILY MEDICINE

## 2017-03-26 PROCEDURE — 83550 IRON BINDING TEST: CPT | Performed by: FAMILY MEDICINE

## 2017-03-26 PROCEDURE — 83540 ASSAY OF IRON: CPT | Performed by: FAMILY MEDICINE

## 2017-03-26 PROCEDURE — 96376 TX/PRO/DX INJ SAME DRUG ADON: CPT

## 2017-03-26 PROCEDURE — 99207 ZZC MOONLIGHTING INDICATOR: CPT | Performed by: FAMILY MEDICINE

## 2017-03-26 PROCEDURE — 36415 COLL VENOUS BLD VENIPUNCTURE: CPT | Performed by: EMERGENCY MEDICINE

## 2017-03-26 RX ORDER — FERROUS SULFATE 325(65) MG
325 TABLET ORAL 2 TIMES DAILY
Status: DISCONTINUED | OUTPATIENT
Start: 2017-03-26 | End: 2017-03-27 | Stop reason: HOSPADM

## 2017-03-26 RX ORDER — DOCUSATE SODIUM 100 MG/1
100 CAPSULE, LIQUID FILLED ORAL 2 TIMES DAILY
Status: DISCONTINUED | OUTPATIENT
Start: 2017-03-26 | End: 2017-03-27 | Stop reason: HOSPADM

## 2017-03-26 RX ADMIN — IRON 325 MG: 65 TABLET ORAL at 20:22

## 2017-03-26 RX ADMIN — PANTOPRAZOLE SODIUM 40 MG: 40 INJECTION, POWDER, FOR SOLUTION INTRAVENOUS at 06:34

## 2017-03-26 RX ADMIN — DOCUSATE SODIUM 100 MG: 100 CAPSULE, LIQUID FILLED ORAL at 10:26

## 2017-03-26 RX ADMIN — DOCUSATE SODIUM 100 MG: 100 CAPSULE, LIQUID FILLED ORAL at 20:22

## 2017-03-26 RX ADMIN — IRON 325 MG: 65 TABLET ORAL at 10:26

## 2017-03-26 ASSESSMENT — ACTIVITIES OF DAILY LIVING (ADL)
DRESS: 0-->INDEPENDENT
RETIRED_COMMUNICATION: 0-->UNDERSTANDS/COMMUNICATES WITHOUT DIFFICULTY
BATHING: 0-->INDEPENDENT
FALL_HISTORY_WITHIN_LAST_SIX_MONTHS: NO
AMBULATION: 0-->INDEPENDENT
SWALLOWING: 0-->SWALLOWS FOODS/LIQUIDS WITHOUT DIFFICULTY
RETIRED_EATING: 0-->INDEPENDENT
TRANSFERRING: 0-->INDEPENDENT
TOILETING: 0-->INDEPENDENT
COGNITION: 0 - NO COGNITION ISSUES REPORTED

## 2017-03-26 NOTE — PROGRESS NOTES
Patient reported he had a normal bowel movement, but noted some bright red blood on toilet paper only.  He denies any hemorrhoids.  Will update MD at this time as he is only scheduled for EGD tomorrow. Next HGB check is at 1600 post transfusion.     Talked with Dr. Parker, EGD only is plan for tomorrow.  Patient is aware and understands.

## 2017-03-26 NOTE — PROGRESS NOTES
[facility]    Hospitalist Progress Note      Assessment & Plan   Mando Houston is a 47 year old male who was admitted on 3/25/2017.      Iron deficiency anemia  Assessment: hgb 6.8, probable upper GI bleed chronic and intermittent. Complains of frequent bloody emesis with acidic and fatty foods usually about 3 in the morning. Does not take a proton pump inhibitor. Uses Ibuprofen often for chronic back pain, has recently switched to tylenol. One or two beer a week, no tobacco or caffeine. Potassium is also low and will correct per standing orders.  Plan: transfuse 2 units of PRBC, goal hgb above 8.0, may try for 9.0 if still lightheaded or chest pain.  3/26/2017 after 2 units is only 7.9.  Will transfuse another 1 U PRBCs and plan for EGD in the morning.  Patient feels he's unlikely to come back if this isn't done during admission.   Add ferrous sulfate 325mg twice daily and colace     Active Problems:  Acute chest pain  Lightheadedness  Assessment: intermittent, related to anemia, troponin and EKG are normal.   Plan: as listed above  3/26/2017 likely due to severe anemia     Gastroesophageal reflux disease with esophagitis  Assessment: probable cause of anemia, expect peptic ulcer or other as source of bleed and anemia.  Plan: start on PPI and stop ibuprofen.    3/26/2017 continue PPI, no NSAIDS, EGD as planned     Hypokalemia  Assessment: Potassium in 3.2   Plan: Potassium replacement per standing orders.  3/26/2017 resolved    DVT Prophylaxis: Low Risk/Ambulatory with no VTE prophylaxis indicated  Code Status: Prior    Disposition: Expected discharge tomorrow after EGD.    Dixie Carvalho MD    Interval History   Feeling a little better, hasn't been lightheaded when up.  Hasn't done much walking other than to the bathroom. No stools.  Denies dark/tarry stools.   Has been doing a lot of NSAIDS due to back pain.     -Data reviewed today: I reviewed all new labs and imaging results over  the last 24 hours. I personally reviewed no images or EKG's today.    Physical Exam   Temp: 97.9  F (36.6  C) Temp src: Oral BP: 144/79 Pulse: 79 Heart Rate: 60 Resp: 18 SpO2: 98 % O2 Device: None (Room air)    Vitals:    03/25/17 1147 03/25/17 1657   Weight: 106.6 kg (235 lb) 106.3 kg (234 lb 5.6 oz)     Vital Signs with Ranges  Temp:  [97.3  F (36.3  C)-99  F (37.2  C)] 97.9  F (36.6  C)  Pulse:  [75-79] 79  Heart Rate:  [] 60  Resp:  [7-21] 18  BP: ()/() 144/79  SpO2:  [97 %-100 %] 98 %  I/O last 3 completed shifts:  In: 2897 [P.O.:900; I.V.:1397]  Out: 1400 [Urine:1400]    Constitutional: Alert, pale, no distress  Respiratory: Clear to auscultation bilaterally  Cardiovascular: regular rate and rhythm no murmur  GI: mildly tender to palpation epigastrium   Skin/Integumen: very pale  Other:      Medications     NaCl 125 mL/hr at 03/26/17 0600     ASPIRIN NOT PRESCRIBED         pantoprazole  40 mg Intravenous QAM AC       Data     Recent Labs  Lab 03/26/17  0642 03/26/17  0020 03/25/17  1245 03/25/17  1200   WBC 5.6  --   --  5.7   HGB 7.9*  --  6.7* 6.8*   MCV 69*  --   --  65*     --   --  310   NA  --   --   --  141   POTASSIUM  --  4.0  --  3.2*   CHLORIDE  --   --   --  109   CO2  --   --   --  24   BUN  --   --   --  9   CR  --   --   --  0.99   ANIONGAP  --   --   --  8   NATALIIA  --   --   --  8.1*   GLC  --   --   --  73   ALBUMIN  --   --   --  3.7   PROTTOTAL  --   --   --  6.9   BILITOTAL  --   --   --  1.2   ALKPHOS  --   --   --  79   ALT  --   --   --  19   AST  --   --   --  10   LIPASE  --   --   --  94   TROPI  --   --   --  <0.015The 99th percentile for upper reference range is 0.045 ug/L.  Troponin values in the range of 0.045 - 0.120 ug/L may be associated with risks of adverse clinical events.       Recent Results (from the past 24 hour(s))   Chest XR,  PA & LAT    Narrative    CHEST TWO VIEWS March 25, 2017 1:18 PM     HISTORY: Shortness of breath.    COMPARISON: Chest  x-rays dated 1/15/2015.    FINDINGS: The lungs are clear. No pleural effusions or pneumothorax.  Heart size and pulmonary vascularity are within normal limits. No  acute fracture.       Impression    IMPRESSION: No evidence of acute cardiopulmonary disease is seen.    LETICIA RUCKER MD

## 2017-03-26 NOTE — PROGRESS NOTES
2nd unit blood infused without difficulty  2nd dose of K+ orally administered  Recheck K+ in 4 hrs  IV started @ 125/hr  K+ recheck = 4.0

## 2017-03-26 NOTE — PLAN OF CARE
Problem: Goal Outcome Summary  Goal: Goal Outcome Summary  Outcome: Improving  Transfused one unit as ordered, tolerated without difficulty.  Independent.  No pain.  Aware of NPO at midnight. Alert and makes needs known.

## 2017-03-27 ENCOUNTER — ANESTHESIA EVENT (OUTPATIENT)
Dept: GASTROENTEROLOGY | Facility: CLINIC | Age: 47
DRG: 379 | End: 2017-03-27
Payer: COMMERCIAL

## 2017-03-27 ENCOUNTER — SURGERY (OUTPATIENT)
Age: 47
End: 2017-03-27

## 2017-03-27 ENCOUNTER — ANESTHESIA (OUTPATIENT)
Dept: GASTROENTEROLOGY | Facility: CLINIC | Age: 47
DRG: 379 | End: 2017-03-27
Payer: COMMERCIAL

## 2017-03-27 VITALS
HEIGHT: 69 IN | TEMPERATURE: 97.7 F | SYSTOLIC BLOOD PRESSURE: 139 MMHG | DIASTOLIC BLOOD PRESSURE: 73 MMHG | BODY MASS INDEX: 34.71 KG/M2 | RESPIRATION RATE: 18 BRPM | HEART RATE: 64 BPM | WEIGHT: 234.35 LBS | OXYGEN SATURATION: 96 %

## 2017-03-27 LAB
HGB BLD-MCNC: 9.6 G/DL (ref 13.3–17.7)
UPPER GI ENDOSCOPY: NORMAL

## 2017-03-27 PROCEDURE — 43239 EGD BIOPSY SINGLE/MULTIPLE: CPT | Performed by: SURGERY

## 2017-03-27 PROCEDURE — 25000132 ZZH RX MED GY IP 250 OP 250 PS 637: Performed by: SURGERY

## 2017-03-27 PROCEDURE — 88305 TISSUE EXAM BY PATHOLOGIST: CPT | Performed by: SURGERY

## 2017-03-27 PROCEDURE — 25000125 ZZHC RX 250: Performed by: NURSE ANESTHETIST, CERTIFIED REGISTERED

## 2017-03-27 PROCEDURE — 88312 SPECIAL STAINS GROUP 1: CPT | Mod: 26 | Performed by: SURGERY

## 2017-03-27 PROCEDURE — 88305 TISSUE EXAM BY PATHOLOGIST: CPT | Mod: 26 | Performed by: SURGERY

## 2017-03-27 PROCEDURE — 25000125 ZZHC RX 250: Performed by: FAMILY MEDICINE

## 2017-03-27 PROCEDURE — 0DB48ZX EXCISION OF ESOPHAGOGASTRIC JUNCTION, VIA NATURAL OR ARTIFICIAL OPENING ENDOSCOPIC, DIAGNOSTIC: ICD-10-PCS | Performed by: SURGERY

## 2017-03-27 PROCEDURE — 88342 IMHCHEM/IMCYTCHM 1ST ANTB: CPT | Performed by: SURGERY

## 2017-03-27 PROCEDURE — 25000132 ZZH RX MED GY IP 250 OP 250 PS 637: Performed by: FAMILY MEDICINE

## 2017-03-27 PROCEDURE — 37000008 ZZH ANESTHESIA TECHNICAL FEE, 1ST 30 MIN: Performed by: SURGERY

## 2017-03-27 PROCEDURE — 88312 SPECIAL STAINS GROUP 1: CPT | Performed by: SURGERY

## 2017-03-27 PROCEDURE — 99238 HOSP IP/OBS DSCHRG MGMT 30/<: CPT | Performed by: INTERNAL MEDICINE

## 2017-03-27 PROCEDURE — 36415 COLL VENOUS BLD VENIPUNCTURE: CPT | Performed by: FAMILY MEDICINE

## 2017-03-27 PROCEDURE — 88342 IMHCHEM/IMCYTCHM 1ST ANTB: CPT | Mod: 26 | Performed by: SURGERY

## 2017-03-27 PROCEDURE — 25800025 ZZH RX 258: Performed by: SURGERY

## 2017-03-27 PROCEDURE — 85018 HEMOGLOBIN: CPT | Performed by: FAMILY MEDICINE

## 2017-03-27 RX ORDER — FERROUS SULFATE 325(65) MG
325 TABLET ORAL 2 TIMES DAILY
Qty: 60 TABLET | Refills: 0 | Status: SHIPPED | OUTPATIENT
Start: 2017-03-27

## 2017-03-27 RX ORDER — ONDANSETRON 2 MG/ML
4 INJECTION INTRAMUSCULAR; INTRAVENOUS
Status: DISCONTINUED | OUTPATIENT
Start: 2017-03-27 | End: 2017-03-27

## 2017-03-27 RX ORDER — GLYCOPYRROLATE 0.2 MG/ML
INJECTION, SOLUTION INTRAMUSCULAR; INTRAVENOUS PRN
Status: DISCONTINUED | OUTPATIENT
Start: 2017-03-27 | End: 2017-03-27

## 2017-03-27 RX ORDER — LIDOCAINE 40 MG/G
CREAM TOPICAL
Status: DISCONTINUED | OUTPATIENT
Start: 2017-03-27 | End: 2017-03-27 | Stop reason: HOSPADM

## 2017-03-27 RX ORDER — SIMETHICONE 40MG/0.6ML
SUSPENSION, DROPS(FINAL DOSAGE FORM)(ML) ORAL PRN
Status: DISCONTINUED | OUTPATIENT
Start: 2017-03-27 | End: 2017-03-27 | Stop reason: HOSPADM

## 2017-03-27 RX ORDER — SODIUM CHLORIDE, SODIUM LACTATE, POTASSIUM CHLORIDE, CALCIUM CHLORIDE 600; 310; 30; 20 MG/100ML; MG/100ML; MG/100ML; MG/100ML
INJECTION, SOLUTION INTRAVENOUS ONCE
Status: COMPLETED | OUTPATIENT
Start: 2017-03-27 | End: 2017-03-27

## 2017-03-27 RX ORDER — PROPOFOL 10 MG/ML
INJECTION, EMULSION INTRAVENOUS PRN
Status: DISCONTINUED | OUTPATIENT
Start: 2017-03-27 | End: 2017-03-27

## 2017-03-27 RX ORDER — PROPOFOL 10 MG/ML
INJECTION, EMULSION INTRAVENOUS CONTINUOUS PRN
Status: DISCONTINUED | OUTPATIENT
Start: 2017-03-27 | End: 2017-03-27

## 2017-03-27 RX ORDER — SODIUM CHLORIDE, SODIUM LACTATE, POTASSIUM CHLORIDE, CALCIUM CHLORIDE 600; 310; 30; 20 MG/100ML; MG/100ML; MG/100ML; MG/100ML
INJECTION, SOLUTION INTRAVENOUS CONTINUOUS
Status: DISCONTINUED | OUTPATIENT
Start: 2017-03-27 | End: 2017-03-27 | Stop reason: HOSPADM

## 2017-03-27 RX ORDER — LIDOCAINE HYDROCHLORIDE 10 MG/ML
INJECTION, SOLUTION EPIDURAL; INFILTRATION; INTRACAUDAL; PERINEURAL PRN
Status: DISCONTINUED | OUTPATIENT
Start: 2017-03-27 | End: 2017-03-27

## 2017-03-27 RX ORDER — PANTOPRAZOLE SODIUM 40 MG/1
40 TABLET, DELAYED RELEASE ORAL DAILY
Qty: 30 TABLET | Refills: 0 | Status: SHIPPED | OUTPATIENT
Start: 2017-03-27

## 2017-03-27 RX ADMIN — GLYCOPYRROLATE 0.2 MG: 0.2 INJECTION, SOLUTION INTRAMUSCULAR; INTRAVENOUS at 09:43

## 2017-03-27 RX ADMIN — SODIUM CHLORIDE, POTASSIUM CHLORIDE, SODIUM LACTATE AND CALCIUM CHLORIDE: 600; 310; 30; 20 INJECTION, SOLUTION INTRAVENOUS at 09:19

## 2017-03-27 RX ADMIN — PROPOFOL 100 MG: 10 INJECTION, EMULSION INTRAVENOUS at 09:44

## 2017-03-27 RX ADMIN — IRON 325 MG: 65 TABLET ORAL at 07:56

## 2017-03-27 RX ADMIN — PANTOPRAZOLE SODIUM 40 MG: 40 INJECTION, POWDER, FOR SOLUTION INTRAVENOUS at 06:08

## 2017-03-27 RX ADMIN — LIDOCAINE HYDROCHLORIDE 50 MG: 10 INJECTION, SOLUTION EPIDURAL; INFILTRATION; INTRACAUDAL; PERINEURAL at 09:43

## 2017-03-27 RX ADMIN — SIMETHICONE 40 MG: 20 SUSPENSION/ DROPS ORAL at 09:41

## 2017-03-27 RX ADMIN — PROPOFOL 200 MCG/KG/MIN: 10 INJECTION, EMULSION INTRAVENOUS at 09:44

## 2017-03-27 RX ADMIN — BENZOCAINE 2 SPRAY: 220 SPRAY, METERED PERIODONTAL at 09:42

## 2017-03-27 NOTE — ANESTHESIA CARE TRANSFER NOTE
Patient: Mando Houston    Procedure(s):  Gastroscopy - Wound Class: II-Clean Contaminated    Diagnosis: Anemia  Diagnosis Additional Information: No value filed.    Anesthesia Type:   MAC     Note:  Airway :Room Air  Patient transferred to:Phase II        Vitals: (Last set prior to Anesthesia Care Transfer)    CRNA VITALS  3/27/2017 0926 - 3/27/2017 0956      3/27/2017             Pulse: 94    Ht Rate: 92    SpO2: 94 %                Electronically Signed By: ARIELLA Sanchez CRNA  March 27, 2017  9:56 AM

## 2017-03-27 NOTE — DISCHARGE SUMMARY
[facility]    Discharge Summary  Hospital Medicine    Date of Admission:  3/25/2017  Date of Discharge:  3/27/2017   Discharging Provider: Radha Nino  Date of Service: 3/27/2017     Primary Care     Clinic, [facility]  No address on file      Identification and Chief Compaint: Mando Houston is a 47 year old male who presented on 3/25/2017 with complaint of lightheadedness and cp.  He gave a hx of intermittent vomiting of food plus some blood over the past month, that seemed to be triggered by coughing. The cough and vomiting felt like gerd to him and would be precipitated by eating. He had been taking a lot of ibuprofen lately and was not on meds for gerd     Discharge Diagnosis    1. Fe deficiency anemia- hgb on admit  6.7,fe sats 5% and mcv 65 so chronic loss.Pt was transfused 3 units prbc and hgb has been stable in the 9's. He was started on protonix and advised to avoid nsiads, etoh, hot/spicy/acidic foods. Bc of his hx of vomiting after eating with some blood in vomitus, egd was done which did not show any cause of acute bleeding-pt had irregular z line and hiatal hernia.  I do not feel that gerd is the cause of the anemia and pt likely needs further eval of gi tract to look for source of blood loss. Pt does report a colonoscopy 1 year ago for BRBPR that was nl per pt report.I have asked pt to contact primary to get a referral to see GI doc.Pt was started on fe so4. Bp/hr nl at d/c    2. GERD- as above, started on protonix and avoid nsaids and specific foods known to cause gerd    3. CP- resolved. Assoc with his cough/eating and vomiting so from gerd. Trop nl, cxr nl, ekg nl        Discharge Disposition   D/c to home    Discharge Orders     Reason for your hospital stay   Iron deficiency Anemia of unclear source, gerd     Follow Up and recommended labs and tests   Need appt with primary md 1 week and hgb at that time       Discharge Medications   Current Discharge Medication  List      START taking these medications    Details   pantoprazole (PROTONIX) 40 MG EC tablet Take 1 tablet (40 mg) by mouth daily Take 30-60 minutes before a meal.  Qty: 30 tablet, Refills: 0    Associated Diagnoses: Gastroesophageal reflux disease with esophagitis      ferrous sulfate (IRON) 325 (65 FE) MG tablet Take 1 tablet (325 mg) by mouth 2 times daily  Qty: 60 tablet, Refills: 0    Associated Diagnoses: Iron deficiency anemia due to chronic blood loss         CONTINUE these medications which have NOT CHANGED    Details   Acetaminophen (TYLENOL PO) Take 650 mg by mouth every 6 hours as needed for mild pain or fever      ORDER FOR DME, SET TO LOCAL PRINT, Respironics REMSTAR 60 Series AutoCPAP 8-16cm H2O, Mirage Fx standard nasal mask    Associated Diagnoses: AMELIE (obstructive sleep apnea)         STOP taking these medications       ASPIRIN NOT PRESCRIBED, INTENTIONAL, Comments:   Reason for Stopping:             Allergies   No Known Allergies    Significant Results and Procedures   Procedures    egd-EGD - medium hiatal hernia. Slight damage to z-line biopsied. Normal stomach without ulceration or bleeding. Normal duodenum  Biopsy pending  Data   Results for orders placed or performed during the hospital encounter of 03/25/17   Chest XR,  PA & LAT    Narrative    CHEST TWO VIEWS March 25, 2017 1:18 PM     HISTORY: Shortness of breath.    COMPARISON: Chest x-rays dated 1/15/2015.    FINDINGS: The lungs are clear. No pleural effusions or pneumothorax.  Heart size and pulmonary vascularity are within normal limits. No  acute fracture.       Impression    IMPRESSION: No evidence of acute cardiopulmonary disease is seen.    LETICIA RUCKER MD       Pending Results   Unresulted Labs Ordered in the Past 30 Days of this Admission     Date and Time Order Name Status Description    3/27/2017 0948 Surgical pathology exam In process           Physical Exam   Temp:  [97.7  F (36.5  C)-98.7  F (37.1  C)] 97.7  F (36.5   C)  Pulse:  [64-75] 64  Heart Rate:  [69-86] 74  Resp:  [14-18] 18  BP: ()/(54-82) 139/73  SpO2:  [92 %-97 %] 96 %  Vitals:    03/25/17 1147 03/25/17 1657   Weight: 106.6 kg (235 lb) 106.3 kg (234 lb 5.6 oz)       Constitutional: nad  CV: Regular  Respiratory: CTA bilaterally  GI: Soft, nontender  Skin: Warm and dry      The discharge plan was discussed with the patient and girlfriend        Radha Nino

## 2017-03-27 NOTE — ANESTHESIA PREPROCEDURE EVALUATION
Anesthesia Evaluation     . Pt has had prior anesthetic. Type: General    No history of anesthetic complications          ROS/MED HX    ENT/Pulmonary:     (+)sleep apnea, doesn't use CPAP , . .    Neurologic:  - neg neurologic ROS     Cardiovascular: Comment: Chest pain secondary to anemia - neg cardiovascular ROS       METS/Exercise Tolerance:  >4 METS   Hematologic:     (+) Anemia, History of Transfusion no previous transfusion reaction -      Musculoskeletal:         GI/Hepatic: Comment: ETOH abuse    (+) GERD Asymptomatic on medication,       Renal/Genitourinary:         Endo:     (+) Obesity, .      Psychiatric:  - neg psychiatric ROS       Infectious Disease:  - neg infectious disease ROS       Malignancy:      - no malignancy   Other:    - neg other ROS                 Physical Exam  Normal systems: cardiovascular, pulmonary and dental    Airway   Mallampati: III  TM distance: >3 FB  Neck ROM: full    Dental     Cardiovascular       Pulmonary                     Anesthesia Plan      History & Physical Review      ASA Status:  2 .    NPO Status:  > 2 hours    Plan for MAC Reason for MAC:  Deep or markedly invasive procedure (G8)         Postoperative Care      Consents  Anesthetic plan, risks, benefits and alternatives discussed with:  Patient..                          .

## 2017-03-27 NOTE — H&P
47 year old year old male here for upper endoscopy for anemia        Patient Active Problem List   Diagnosis     CARDIOVASCULAR SCREENING; LDL GOAL LESS THAN 160     Chronic low back pain     Esophageal reflux     Grief reaction     Alcohol abuse     Iron deficiency anemia     Chronic neck pain     Obesity     AMELIE (obstructive sleep apnea)- severe ()     Acute chest pain     Lightheadedness     Gastroesophageal reflux disease with esophagitis     Hypokalemia     GI bleed due to NSAIDs       Past Medical History:   Diagnosis Date     AR (allergic rhinitis)     CONJUNCTIVITIS     MEDICAL HISTORY OF -     Hx OF BROCHOSPASM       Past Surgical History:   Procedure Laterality Date     PE TUBES  1970s    as child     SURGICAL HISTORY OF -   1990s    LIPOMA, CHEST     TONSILLECTOMY  1980       Family History   Problem Relation Age of Onset     HEART DISEASE Mother      CHF     Unknown/Adopted Father      Unknown/Adopted Maternal Grandmother      Unknown/Adopted Maternal Grandfather      Unknown/Adopted Paternal Grandmother      Unknown/Adopted Paternal Grandfather      DIABETES No family hx of      Coronary Artery Disease No family hx of      Cancer - colorectal No family hx of        No current outpatient prescriptions on file.       No Known Allergies    Pt reports that he has never smoked. He has never used smokeless tobacco. He reports that he drinks alcohol. He reports that he does not use illicit drugs.    Exam:    Awake, Alert OX3  Lungs - CTA bilaterally  CV - RRR, no murmurs, distal pulses intact  Abd - soft, non-distended, non-tender, +BS  Extr - No cyanosis or edema    A/P 47 year old year old male in need of upper endoscopy for anemia. Risks, benefits, alternatives, and complications were discussed including the possibility of perforation and the patient agreed to proceed.    Len Schwarz MD

## 2017-03-27 NOTE — PLAN OF CARE
Problem: Goal Outcome Summary  Goal: Goal Outcome Summary  Outcome: No Change  Denies pain, sleeping, awakens easily, no BM tonight, up indep. NPO since midnight.

## 2017-03-27 NOTE — OP NOTE
EGD - medium hiatal hernia.  Slight damage to z-line biopsied.  Normal stomach without ulceration or bleeding.  Normal duodenum.

## 2017-03-27 NOTE — PLAN OF CARE
Problem: Discharge Planning  Goal: Discharge Planning (Adult, OB, Behavioral, Peds)  [place] [facility] DISCHARGE NOTE     Patient discharged to home at 1:20 PM via ambulation. Accompanied by significant other and staff. Discharge instructions reviewed with patient, opportunity offered to ask questions. Prescriptions sent to patients preferred pharmacy. All belongings sent with patient.     Larissa Santos

## 2017-03-27 NOTE — PHARMACY - DISCHARGE MEDICATION RECONCILIATION
Discharge medication review for this patient is complete. Pharmacist assisted with medication reconciliation of discharge medications with prior to admission medications.     The following changes were made to the discharge medication list based on pharmacist review:  Added:  none  Discontinued: none  Changed: none      Patient's Discharge Medication List  - medications as listed on After Visit Summary (AVS)     Review of your medicines      START taking       Dose / Directions    ferrous sulfate 325 (65 FE) MG tablet   Commonly known as:  IRON        Dose:  325 mg   Take 1 tablet (325 mg) by mouth 2 times daily   Quantity:  60 tablet   Refills:  0       pantoprazole 40 MG EC tablet   Commonly known as:  PROTONIX        Dose:  40 mg   Take 1 tablet (40 mg) by mouth daily Take 30-60 minutes before a meal.   Quantity:  30 tablet   Refills:  0         CONTINUE these medicines which have NOT CHANGED       Dose / Directions    order for DME   Used for:  AMELIE (obstructive sleep apnea)        Respironics REMSTAR 60 Series AutoCPAP 8-16cm H2O, Mirage Fx standard nasal mask   Refills:  0       TYLENOL PO        Dose:  650 mg   Take 650 mg by mouth every 6 hours as needed for mild pain or fever   Refills:  0         STOP taking          ASPIRIN NOT PRESCRIBED   Commonly known as:  INTENTIONAL                Where to get your medicines      Some of these will need a paper prescription and others can be bought over the counter. Ask your nurse if you have questions.     Bring a paper prescription for each of these medications      ferrous sulfate 325 (65 FE) MG tablet     pantoprazole 40 MG EC tablet           Isa Flores PharmD

## 2017-03-27 NOTE — ANESTHESIA POSTPROCEDURE EVALUATION
Patient: Mando Houston    Procedure(s):  Gastroscopy - Wound Class: II-Clean Contaminated    Diagnosis:Anemia  Diagnosis Additional Information: No value filed.    Anesthesia Type:  MAC    Note:  Anesthesia Post Evaluation    Patient location during evaluation: Bedside  Patient participation: Able to fully participate in evaluation  Level of consciousness: awake and alert  Pain management: adequate  Airway patency: patent  Cardiovascular status: acceptable  Respiratory status: acceptable  Hydration status: acceptable  PONV: none     Anesthetic complications: None          Last vitals:  Vitals:    03/26/17 2328 03/27/17 0321 03/27/17 0738   BP: 118/66 138/69 121/70   Pulse: 70 70    Resp: 18 18 18   Temp: 36.7  C (98  F) 36.9  C (98.4  F) 36.7  C (98.1  F)   SpO2: 95% 96% 94%         Electronically Signed By: ARIELLA Sanchez CRNA  March 27, 2017  9:57 AM

## 2017-03-28 ENCOUNTER — TELEPHONE (OUTPATIENT)
Dept: FAMILY MEDICINE | Facility: CLINIC | Age: 47
End: 2017-03-28

## 2017-03-28 NOTE — TELEPHONE ENCOUNTER
ED / Discharge Outreach Protocol    Patient Contact    Attempt # 1    Was call answered?  No.  Left message on voicemail with information to call me back.      Sharmila STORY, RN, BSN

## 2017-03-28 NOTE — TELEPHONE ENCOUNTER
This patient was discharged from [facility] on 3/27/2017.    Discharge Diagnosis: Gastroesophageal Reflux Disease With Esophagitis, Anemia    A follow-up visit has not been scheduled.   Next 5 appointments (look out 90 days)     Apr 19, 2017  4:00 PM CDT   Return Visit with ARIELLA Crespo [facility] ([facility])    [address] [id number] [phone]              Number of ED/ER visits in the last 12 months:       Please follow-up with patient.    Flor Fernandez,

## 2017-03-30 NOTE — TELEPHONE ENCOUNTER
ED / Discharge Outreach Protocol    Patient Contact    Attempt # 2    Was call answered?  No.  Left message on voicemail with information to call me back.      Sharmila STORY, RN, BSN

## 2017-03-31 LAB — COPATH REPORT: NORMAL

## 2017-04-03 ENCOUNTER — OFFICE VISIT (OUTPATIENT)
Dept: INTERNAL MEDICINE | Facility: CLINIC | Age: 47
End: 2017-04-03
Payer: COMMERCIAL

## 2017-04-03 VITALS
OXYGEN SATURATION: 98 % | SYSTOLIC BLOOD PRESSURE: 118 MMHG | DIASTOLIC BLOOD PRESSURE: 78 MMHG | BODY MASS INDEX: 34.85 KG/M2 | HEART RATE: 68 BPM | TEMPERATURE: 98.4 F | WEIGHT: 236 LBS

## 2017-04-03 DIAGNOSIS — K62.5 BRBPR (BRIGHT RED BLOOD PER RECTUM): ICD-10-CM

## 2017-04-03 DIAGNOSIS — D50.0 IRON DEFICIENCY ANEMIA DUE TO CHRONIC BLOOD LOSS: Primary | ICD-10-CM

## 2017-04-03 PROCEDURE — 99214 OFFICE O/P EST MOD 30 MIN: CPT | Performed by: INTERNAL MEDICINE

## 2017-04-03 NOTE — PATIENT INSTRUCTIONS
Please call to schedule an appointment with [facility]: [phone], to help further investigate your history of rectal blood and find the exact source(s) of your recent profound anemia.    Continue your iron tablets.  Continue the Protonix for at least 2 months.  Avoid aspirin for now.  Avoid: excedrin, ibuprofen (aka Advil, Motrin) and Aleve (aka naproxen).  Avoid alcohol.    You should have a complete blood count (including a hemoglobin) and iron studies in 3 months from now.

## 2017-04-03 NOTE — MR AVS SNAPSHOT
After Visit Summary   4/3/2017    Mando Houston    MRN: [id number]           Patient Information     Date Of Birth          1970        Visit Information        Provider Department      4/3/2017 5:30 PM Shelly Dickerson MD [facility]        Today's Diagnoses     Iron deficiency anemia due to chronic blood loss    -  1    BRBPR (bright red blood per rectum)          Care Instructions    Please call to schedule an appointment with [facility]: [phone], to help further investigate your history of rectal blood and find the exact source(s) of your recent profound anemia.    Continue your iron tablets.  Continue the Protonix for at least 2 months.  Avoid aspirin for now.  Avoid: excedrin, ibuprofen (aka Advil, Motrin) and Aleve (aka naproxen).  Avoid alcohol.    You should have a complete blood count (including a hemoglobin) and iron studies in 3 months from now.           Follow-ups after your visit        Additional Services     GASTROENTEROLOGY ADULT REF CONSULT ONLY       Preferred Location: [facility] [phone]      Please be aware that coverage of these services is subject to the terms and limitations of your health insurance plan.  Call member services at your health plan with any benefit or coverage questions.  Any procedures must be performed at a [place] facility OR coordinated by your clinic's referral office.    Please bring the following with you to your appointment:    (1) Any X-Rays, CTs or MRIs which have been performed.  Contact the facility where they were done to arrange for  prior to your scheduled appointment.    (2) List of current medications   (3) This referral request   (4) Any documents/labs given to you for this referral                  Your next 10 appointments already scheduled     Apr 19, 2017  4:00 PM CDT   Return Visit with ARIELLA Crespo CNP   [facility] ([facility])    [address]  "[place] [id number] [phone]              Who to contact     If you have questions or need follow up information about today's clinic visit or your schedule please contact [facility] directly at [phone].  Normal or non-critical lab and imaging results will be communicated to you by MyChart, letter or phone within 4 business days after the clinic has received the results. If you do not hear from us within 7 days, please contact the clinic through MyChart or phone. If you have a critical or abnormal lab result, we will notify you by phone as soon as possible.  Submit refill requests through [employer] or call your pharmacy and they will forward the refill request to us. Please allow 3 business days for your refill to be completed.          Additional Information About Your Visit        MyChart Information     [employer] lets you send messages to your doctor, view your test results, renew your prescriptions, schedule appointments and more. To sign up, go to [email or web address] . Click on \"Log in\" on the left side of the screen, which will take you to the Welcome page. Then click on \"Sign up Now\" on the right side of the page.     You will be asked to enter the access code listed below, as well as some personal information. Please follow the directions to create your username and password.     Your access code is: [email or web address]  Expires: 2017  4:29 PM     Your access code will  in 90 days. If you need help or a new code, please call your [facility] or [phone].        Care EveryWhere ID     This is your Care EveryWhere ID. This could be used by other organizations to access your [place] medical records  [id number]        Your Vitals Were     Pulse Temperature Pulse Oximetry BMI (Body Mass Index)          68 98.4  F (36.9  C) (Oral) 98% 34.85 kg/m2         Blood Pressure from Last 3 Encounters:   17 118/78   17 139/73   16 118/77    Weight from " Last 3 Encounters:   04/03/17 236 lb (107 kg)   03/25/17 234 lb 5.6 oz (106.3 kg)   11/02/16 230 lb (104.3 kg)              We Performed the Following     GASTROENTEROLOGY ADULT REF CONSULT ONLY        Primary Care Provider Office Phone #    Malinda Perez [phone]       No address on file        Thank you!     Thank you for choosing [facility]  for your care. Our goal is always to provide you with excellent care. Hearing back from our patients is one way we can continue to improve our services. Please take a few minutes to complete the written survey that you may receive in the mail after your visit with us. Thank you!             Your Updated Medication List - Protect others around you: Learn how to safely use, store and throw away your medicines at www.disposemymeds.org.          This list is accurate as of: 4/3/17  6:56 PM.  Always use your most recent med list.                   Brand Name Dispense Instructions for use    ferrous sulfate 325 (65 FE) MG tablet    IRON    60 tablet    Take 1 tablet (325 mg) by mouth 2 times daily       pantoprazole 40 MG EC tablet    PROTONIX    30 tablet    Take 1 tablet (40 mg) by mouth daily Take 30-60 minutes before a meal.

## 2017-04-03 NOTE — PROGRESS NOTES
"  SUBJECTIVE:                                                    Mando Houston is a 47 year old male who presents to clinic today for the following health issues:      Anemia      Duration: 2 weeks ago passed out in woods    Description (location/character/radiation): went to  ER and had low HGB    Intensity:  Feels fine    Accompanying signs and symptoms: a little fatigued    History (similar episodes/previous evaluation): has had a low HGB in the past    Precipitating or alleviating factors: None    Therapies tried and outcome: was given blood in ER     Patient had an EGD on 3.27.17:  \"medium hiatal hernia. Slight damage to z-line biopsied. Normal stomach without ulceration or bleeding. Normal duodenum.\"  GE junction biopsy during the EGD:   \"FINAL DIAGNOSIS:   Gastroesophageal junction biopsy:   - Benign ulceration, negative for fungus and viral inclusions (benign   squamous epithelium without glandular transformation zone). \"  Patient was hospitalized at [facility] 3.25.17-3.27.17 for acute DONNA-see that discharge summary-the EGD and bx mentioned above were from that hospitalization-he was given (3 u) pRBCs, started on iron, and protonix; aspirin and NSAIDs and ETOH were d/krishna; Hgb improved. Per the hospitalist, they were not convinced that the cause of the DONNA was an upper GI bleed (given no active bleeding on EGD; though the patient does have a hiatal hernia and the biopsy of the zline is described as having an ulceration in the path report).  A referral to GI was thus advised. The BUN was measured once during the hospital stay and was unelevated.      He did not know that he was supposed to avoid ETOH-he drank a bit on the weekend.  He has not had vomiting, or nausea.  He still has intermittent chest pain. (ACS r/oed in the ED).  No real heartburn since then.   No dark stools, however:  He reports bright red blood per rectum a year ago at [facility] in [place], (per patient's recollection, it was within normal " limits); he reports intermittent blood upon wiping-even last week, in the hospital. He has no known history of hemorrhoids.            Problem list and histories reviewed & adjusted, as indicated.  Additional history: as documented    Patient Active Problem List   Diagnosis     CARDIOVASCULAR SCREENING; LDL GOAL LESS THAN 160     Chronic low back pain     Esophageal reflux     Grief reaction     Alcohol abuse     Iron deficiency anemia     Chronic neck pain     Obesity     AMELIE (obstructive sleep apnea)- severe ()     Acute chest pain     Lightheadedness     Gastroesophageal reflux disease with esophagitis     Hypokalemia     GI bleed due to NSAIDs     Past Surgical History:   Procedure Laterality Date     ESOPHAGOSCOPY, GASTROSCOPY, DUODENOSCOPY (EGD), COMBINED N/A 3/27/2017    Procedure: COMBINED ESOPHAGOSCOPY, GASTROSCOPY, DUODENOSCOPY (EGD), BIOPSY SINGLE OR MULTIPLE;  Surgeon: Len Schwarz MD;  Location: [facility]     PE TUBES  1970s    as child     SURGICAL HISTORY OF -   1990s    LIPOMA, CHEST     TONSILLECTOMY  1980       Social History   Substance Use Topics     Smoking status: Never Smoker     Smokeless tobacco: Never Used      Comment: non-smoking household     Alcohol use Yes      Comment: rare as of 01/05/15     Family History   Problem Relation Age of Onset     HEART DISEASE Mother      CHF     Unknown/Adopted Father      Unknown/Adopted Maternal Grandmother      Unknown/Adopted Maternal Grandfather      Unknown/Adopted Paternal Grandmother      Unknown/Adopted Paternal Grandfather      DIABETES No family hx of      Coronary Artery Disease No family hx of      Cancer - colorectal No family hx of          Current Outpatient Prescriptions   Medication Sig Dispense Refill     pantoprazole (PROTONIX) 40 MG EC tablet Take 1 tablet (40 mg) by mouth daily Take 30-60 minutes before a meal. 30 tablet 0     ferrous sulfate (IRON) 325 (65 FE) MG tablet Take 1 tablet (325 mg) by mouth 2 times daily 60 tablet 0        Reviewed and updated as needed this visit by clinical staff  Tobacco  Allergies  Meds  Med Hx  Surg Hx  Fam Hx  Soc Hx      Reviewed and updated as needed this visit by Provider         ==============================================================  ROS:  Constitutional, HEENT, cardiovascular, pulmonary, GI, , musculoskeletal, neuro, skin, endocrine and psych systems are negative, except as otherwise noted.       OBJECTIVE:                                                    /78 (BP Location: Right arm, Patient Position: Chair, Cuff Size: Adult Large)  Pulse 68  Temp 98.4  F (36.9  C) (Oral)  Wt 236 lb (107 kg)  SpO2 98%  BMI 34.85 kg/m2  Body mass index is 34.85 kg/(m^2).     GENERAL APPEARANCE: healthy, alert and in no distress  EYES: Eyes grossly normal to inspection, and conjunctivae and sclerae normal  HENT: head normocephalic and atraumatic and mouth without ulcers or lesions, oropharynx clear and oral mucous membranes moist  NECK: no noticeable adenopathy, no asymmetry, masses, or scars   RESP: lungs clear to auscultation - no rales, rhonchi or wheezes  CV: regular rate and rhythm, normal S1 S2, no S3 or S4, no murmur, click or rub, no peripheral edema and peripheral pulses strong  ABDOMEN: soft, nontender, no hepatosplenomegaly, no masses and bowel sounds normal  MS: no musculoskeletal defects are noted and gait is age appropriate without ataxia  SKIN: no suspicious lesions or rashes  NEURO: mentation intact and speech normal  PSYCH: mentation appears normal and affect normal/bright.     ASSESSMENT/PLAN:                                                        ICD-10-CM    1. Iron deficiency anemia due to chronic blood loss D50.0 GASTROENTEROLOGY ADULT REF CONSULT ONLY     CBC with platelets     Ferritin     Iron and iron binding capacity   2. BRBPR (bright red blood per rectum) K62.5 GASTROENTEROLOGY ADULT REF CONSULT ONLY     CBC with platelets     Time spent coordinating care was  approximately 30 minutes out of a total of approximately 40 minutes (which was the total duration of the appointment) including the diagnosis, prognosis and treatment of the above medical conditions.      ASSESSMENT: it is still unclear at this time of the Hgb drop could all be attributed to the small ulceration noted on the G.E. z-line biopsy, and if perhaps lower GI bleeding is also contributing, maricel given the history of interrmittent blood upon wiping after having a BM  PLAN:  As per orders above and patient instructions below.       Patient Instructions   Please call to schedule an appointment with [facility]: [phone], to help further investigate your history of rectal blood and find the exact source(s) of your recent profound anemia.    Continue your iron tablets.  Continue the Protonix for at least 2 months.  Avoid aspirin for now.  Avoid: excedrin, ibuprofen (aka Advil, Motrin) and Aleve (aka naproxen).  Avoid alcohol.    You should have a complete blood count (including a hemoglobin) and iron studies in 3 months from now.                   Shelly Dickerson MD  [facility]

## 2017-04-03 NOTE — NURSING NOTE
"Chief Complaint   Patient presents with     Anemia       Initial /78 (BP Location: Right arm, Patient Position: Chair, Cuff Size: Adult Large)  Pulse 68  Temp 98.4  F (36.9  C) (Oral)  Wt 236 lb (107 kg)  SpO2 98%  BMI 34.85 kg/m2 Estimated body mass index is 34.85 kg/(m^2) as calculated from the following:    Height as of 3/25/17: 5' 9\" (1.753 m).    Weight as of this encounter: 236 lb (107 kg).  Medication Reconciliation: complete    Stephany Beck CMA  "

## 2017-04-05 ENCOUNTER — TRANSFERRED RECORDS (OUTPATIENT)
Dept: HEALTH INFORMATION MANAGEMENT | Facility: CLINIC | Age: 47
End: 2017-04-05

## 2017-04-05 ENCOUNTER — RECORDS - HEALTHEAST (OUTPATIENT)
Dept: ADMINISTRATIVE | Facility: OTHER | Age: 47
End: 2017-04-05

## 2017-04-19 ENCOUNTER — TELEPHONE (OUTPATIENT)
Dept: PALLIATIVE MEDICINE | Facility: CLINIC | Age: 47
End: 2017-04-19

## 2017-04-19 ENCOUNTER — OFFICE VISIT (OUTPATIENT)
Dept: PALLIATIVE MEDICINE | Facility: CLINIC | Age: 47
End: 2017-04-19
Payer: COMMERCIAL

## 2017-04-19 ENCOUNTER — RECORDS - HEALTHEAST (OUTPATIENT)
Dept: ADMINISTRATIVE | Facility: OTHER | Age: 47
End: 2017-04-19

## 2017-04-19 VITALS
HEART RATE: 66 BPM | SYSTOLIC BLOOD PRESSURE: 138 MMHG | WEIGHT: 236 LBS | DIASTOLIC BLOOD PRESSURE: 84 MMHG | BODY MASS INDEX: 34.85 KG/M2

## 2017-04-19 DIAGNOSIS — G89.29 CHRONIC BILATERAL LOW BACK PAIN WITHOUT SCIATICA: Primary | ICD-10-CM

## 2017-04-19 DIAGNOSIS — M79.18 MYOFASCIAL PAIN: ICD-10-CM

## 2017-04-19 DIAGNOSIS — M54.50 CHRONIC BILATERAL LOW BACK PAIN WITHOUT SCIATICA: Primary | ICD-10-CM

## 2017-04-19 DIAGNOSIS — M47.817 LUMBOSACRAL SPONDYLOSIS WITHOUT MYELOPATHY: ICD-10-CM

## 2017-04-19 PROCEDURE — 99214 OFFICE O/P EST MOD 30 MIN: CPT | Performed by: NURSE PRACTITIONER

## 2017-04-19 ASSESSMENT — PAIN SCALES - GENERAL: PAINLEVEL: EXTREME PAIN (8)

## 2017-04-19 NOTE — TELEPHONE ENCOUNTER
Patient had at least 70% relief of his typical axial low back pain following lumbar RFA done on 125/2016 by Dr. rJ Pettit. Patient's relief lasted about 10-12 months, it has now worn off.   Please begin prior authorization for repeat lumbar RFA.    Thanks.  Priscilla KRISHNAN, RN CNP, FNP  [facility] [place]

## 2017-04-19 NOTE — NURSING NOTE
"Chief Complaint   Patient presents with     Pain       Initial Wt 107 kg (236 lb)  BMI 34.85 kg/m2 Estimated body mass index is 34.85 kg/(m^2) as calculated from the following:    Height as of 3/25/17: 1.753 m (5' 9\").    Weight as of this encounter: 107 kg (236 lb).  Medication Reconciliation: complete     Althea Lieberman CMA (AAMA)      "

## 2017-04-19 NOTE — PROGRESS NOTES
"[facility]    4/19/2017      Chief complaint: lower back pain    Interval history:  Mando Houston is a 47 year old male is known to me for chronic low back pain, physical deconditioning, chronic neck pain, poor memory, obstructive sleep apnea.    Recommendations/plan at the last visit on 11/2/2016 Included:  1. Physical Therapy:consider return to PHYSICAL THERAPY once patient has had repeat Lumbar RFA done  2. Clinical Health Psychologist: none needed  3. Diagnostic Studies:  None  4. Medication Management:    1. No oral medication at present  5. Further procedures recommended: repeat lumbar RFA (lumbar RFA #1 was done on 1/25/2016 and has good relief until now)  6. Recommendations to PCP: see above  7. Follow up: 8-10 weeks        Since his last visit, Mando Houston reports:  -He is feeling a little bit worse. He was recently hospitalized from an upper respiratory infection [place]. He was anemic and he had 3 units of blood. He has had an upper GI and will be having a colonoscopy.   -he has ongoing axial low back pain similar to what he had prior to lumbar RFA in January 2016. Patient states he had at least 70% relief of his typical low back pain for at least 10-12 months or so. He would like to consider a repeat Lumbar  RFA.       At this point, the patient's participation with our multidisciplinary team includes:  The patient has been compliant with the program.  Pain Group - none ordered  PT - seen by Zenaida Garrett, PHYSICAL THERAPY is on hold, patient is not receptive at this time to self-care  Health Psych - has seen Dr. Piper a couple of times, last on 1/27/2016    Pain scores:  Pain intensity on average is 5 on a scale of 0-10.  Range is 2-9/10. Pain rating right now is 8/10. Pain is described as \"aching, exhausting, penetrating, miserable.\"  Pain is continuous in nature.    Current pain relevant medications:   none       Previous " "Medications:  OPIATES:he has pain meds at home \"from over the years\"   NSAIDS: ibuprofen (somewhat helpful)  MUSCLE RELAXANTS: Flexeril (somewhat helpful)  ANTI-MIGRAINE MEDS: Imitrex (not helpful)  ANTI-DEPRESSANTS: not sure what he has tried in the past  SLEEP AIDS: no  ANTI-CONVULSANTS: no  TOPICALS: Lidoderm (helpful)  Other meds: Tylenol (helpful), Excedrin (helpful for headaches)    Past Pain Treatments:  Mando Houston has been seen at a pain clinic in the past. Has been seen at [facility] in [place] years ago.   PT: tried, not helpful  Acupuncture: no  Chiropractic: tried, a little helpful  TENs Unit: tried, not helpful, but hasn't tried it for years      Injections: has had 2 LESIs in the past, one done at [facility] and one done maybe at the  of ???  -11/2/2015 bilateral L4-L5-S1 MBB #1 completed by Dr. Jr Pettit (helpful, still feels the injections are helpful)  -12/18/2015 LMBB #2 with Dr. Jr Pettit (helpful)  -1/25/2016 lumbar RFA (#1) with Dr. Jr Pettit has been [facility].          Medications:  Current Outpatient Prescriptions   Medication Sig Dispense Refill     pantoprazole (PROTONIX) 40 MG EC tablet Take 1 tablet (40 mg) by mouth daily Take 30-60 minutes before a meal. (Patient not taking: Reported on 4/19/2017) 30 tablet 0     ferrous sulfate (IRON) 325 (65 FE) MG tablet Take 1 tablet (325 mg) by mouth 2 times daily (Patient not taking: Reported on 4/19/2017) 60 tablet 0       Medical History: any changes in medical history since they were last seen? No    Social History:   Home situation: single, lives with girlfriend. No kids  Occupation/Schooling: working as  at [employer]. Works full time  Tobacco use: no  Alcohol use: occasionally, last drank on July 4th weekend. Used to drink more regularly.   Drug use: no  History of chemical dependency treatment: Had a DUI and attended AA    Review of Systems:  ROS is positive as per HPI as well as fatigue, headache, dizziness, anemia, " "shortness of breath, back pain and neck pain, mood swings and is negative for fevers, chills, sweats, or constipation, diarrhea.    Physical Exam:  Vital signs: Blood pressure 138/84, pulse 66, weight 107 kg (236 lb).    Behavioral observations:  Awake, alert, in NAD. cooperative    Gait:  normal    Musculoskeletal exam:   Lumbar flexion to 30 degrees  Lumbar extension to 20 degrees, painful  Lumbar rotation/extension to the right is painful  Lumbar rotation/extension to the left is painful  SI joints are non-tender  GTs are non-tender  Mild myofascial pain on the lower lumbar paraspinals       Neuro exam:  SILT in all extremities    Skin/vascular/autonomic:  No suspicious lesions on exposed skin.      Other:  NA       [place] Prescription Monitoring Program:  Reviewed-no controlled substances in the past 12 months    DIRE Score for selecting candidates for long term opioid analgesia for chronic pain:  Diagnosis  1  Intractablility  1  Risk    Psychological health  2    Chemical health  2    Reliability  2    Social support  3  Efficacy  2    Total DIRE Score = 13. Note that   7-13 predicts poor outcome (compliance and efficacy) from opioid prescribing; 14-21 predicts good outcome (compliance and efficacy)  from opioid prescribing.      Assessment:   1. Chronic low back pain, mild degenerative changes per imaging. Pain is worst with sitting and with extension and extension/rotation, likely more facetogenic cause.   2. Lumbar spondylosis  3. Lumbar DDD  4. Myofascial pain  5. Physical deconditioning  6. Chronic neck pain  7. Poor memory, had previously reported he had  been using \"pain meds from his stash over the years\" but had no idea what he was taking or doses. He is not doing this any more.   8. Has OBSTRUCTIVE SLEEP APNEA, trying to use the CPAP machine on a nightly basis          Plan:    1. Physical Therapy:consider return to PHYSICAL THERAPY once patient has had repeat Lumbar RFA done  2. Clinical Health " Psychologist: none needed  3. Diagnostic Studies:  None  4. Medication Management:    1. No oral medication at present  5. Further procedures recommended: repeat lumbar RFA (lumbar RFA #1 was done on 1/25/2016 and has good relief until now)  6. Recommendations to PCP: see above  7. Follow up: 8-12 weeks        Total time spent face to face was 30 minutes and more than 50% of face to face time was spent in counseling and/or coordination of care regarding the diagnosis and recommendations above.      Priscilla KRISHNAN, RN CNP, FNP  [facility] [place]

## 2017-04-19 NOTE — MR AVS SNAPSHOT
After Visit Summary   4/19/2017    Mando Houston    MRN: [id number]           Patient Information     Date Of Birth          1970        Visit Information        Provider Department      4/19/2017 4:00 PM Priscilla Martinez APRN [facility]        Today's Diagnoses     Chronic bilateral low back pain without sciatica    -  1    Lumbosacral spondylosis without myelopathy        Myofascial pain          Care Instructions    PLAN:  1. Continue home activity  2. Medications:   1. No oral medications from our clinic at present  3. Procedures: I have started a phone call encounter to begin prior authorization for repeat lumbar RFA, our office will call you  4. Follow-up with me in 8-12 weeks.      ----------------------------------------------------------------  Nurse Triage line:  [phone]   Call this number with any questions or concerns. You may leave a detailed message anytime. Calls are typically returned Monday through Friday between 8 AM and 4:30 PM. We usually get back to you within 2 business days depending on the issue/request.       Medication refills:    For non-narcotic medications, call your pharmacy directly to request a refill. The pharmacy will contact the [facility] for authorization. Please allow 3-4 days for these refills to be processed.     For narcotic refills, call the nurse triage line or send a [employer] message. Please contact us 7-10 days before your refill is due. The message MUST include the name of the specific medication(s) requested and how you would like to receive the prescription(s). The options are as follows:    Pain Clinic staff can mail the prescription to your pharmacy. Please tell us the name of the pharmacy.    You may pick the prescription up at the Pain Clinic (tell us the location) or during a clinic visit with your pain provider    Pain Clinic staff can deliver the prescription to the [facility] pharmacy in the clinic building.  "Please tell us the location.      Scheduling number: [phone].  Call this number to schedule or change appointments.    We believe regular attendance is key to your success in our program.    Any time you are unable to keep your appointment we ask that you call us at least 24 hours in advance to let us know. This will allow us to offer the appointment time to another patient.             Follow-ups after your visit        Who to contact     If you have questions or need follow up information about today's clinic visit or your schedule please contact [facility] directly at [phone].  Normal or non-critical lab and imaging results will be communicated to you by [employer], letter or phone within 4 business days after the clinic has received the results. If you do not hear from us within 7 days, please contact the clinic through [employer] or phone. If you have a critical or abnormal lab result, we will notify you by phone as soon as possible.  Submit refill requests through [employer] or call your pharmacy and they will forward the refill request to us. Please allow 3 business days for your refill to be completed.          Additional Information About Your Visit        MyChart Information     [employer] lets you send messages to your doctor, view your test results, renew your prescriptions, schedule appointments and more. To sign up, go to [email or web address] . Click on \"Log in\" on the left side of the screen, which will take you to the Welcome page. Then click on \"Sign up Now\" on the right side of the page.     You will be asked to enter the access code listed below, as well as some personal information. Please follow the directions to create your username and password.     Your access code is: [email or web address]  Expires: 2017  4:29 PM     Your access code will  in 90 days. If you need help or a new code, please call your [facility] or [phone].        Care EveryWhere ID     This is your " Care EveryWhere ID. This could be used by other organizations to access your [facility] medical records  [id number]        Your Vitals Were     Pulse BMI (Body Mass Index)                66 34.85 kg/m2           Blood Pressure from Last 3 Encounters:   04/19/17 138/84   04/03/17 118/78   03/27/17 139/73    Weight from Last 3 Encounters:   04/19/17 107 kg (236 lb)   04/03/17 107 kg (236 lb)   03/25/17 106.3 kg (234 lb 5.6 oz)              Today, you had the following     No orders found for display       Primary Care Provider Office Phone # Fax #    Shelly Dickerson [phone]       [facility] [address] [facility] [address]        Thank you!     Thank you for choosing [facility]  for your care. Our goal is always to provide you with excellent care. Hearing back from our patients is one way we can continue to improve our services. Please take a few minutes to complete the written survey that you may receive in the mail after your visit with us. Thank you!             Your Updated Medication List - Protect others around you: Learn how to safely use, store and throw away your medicines at www.disposemymeds.org.          This list is accurate as of: 4/19/17  4:42 PM.  Always use your most recent med list.                   Brand Name Dispense Instructions for use    ferrous sulfate 325 (65 FE) MG tablet    IRON    60 tablet    Take 1 tablet (325 mg) by mouth 2 times daily       pantoprazole 40 MG EC tablet    PROTONIX    30 tablet    Take 1 tablet (40 mg) by mouth daily Take 30-60 minutes before a meal.

## 2017-04-19 NOTE — PATIENT INSTRUCTIONS
PLAN:  1. Continue home activity  2. Medications:   1. No oral medications from our clinic at present  3. Procedures: I have started a phone call encounter to begin prior authorization for repeat lumbar RFA, our office will call you  4. Follow-up with me in 8-12 weeks.      ----------------------------------------------------------------  Nurse Triage line:  [phone]   Call this number with any questions or concerns. You may leave a detailed message anytime. Calls are typically returned Monday through Friday between 8 AM and 4:30 PM. We usually get back to you within 2 business days depending on the issue/request.       Medication refills:    For non-narcotic medications, call your pharmacy directly to request a refill. The pharmacy will contact the [facility] for authorization. Please allow 3-4 days for these refills to be processed.     For narcotic refills, call the nurse triage line or send a [employer] message. Please contact us 7-10 days before your refill is due. The message MUST include the name of the specific medication(s) requested and how you would like to receive the prescription(s). The options are as follows:    Pain Clinic staff can mail the prescription to your pharmacy. Please tell us the name of the pharmacy.    You may pick the prescription up at the Pain Clinic (tell us the location) or during a clinic visit with your pain provider    Pain Clinic staff can deliver the prescription to the [facility] pharmacy in the clinic building. Please tell us the location.      Scheduling number: [phone].  Call this number to schedule or change appointments.    We believe regular attendance is key to your success in our program.    Any time you are unable to keep your appointment we ask that you call us at least 24 hours in advance to let us know. This will allow us to offer the appointment time to another patient.

## 2017-04-19 NOTE — TELEPHONE ENCOUNTER
Sending to Maddison Mars.     Chika Florentino RN, [facility]  Pain Clinic Care Coordinator

## 2017-04-20 NOTE — TELEPHONE ENCOUNTER
PA pending additional information -Office note to be completed and signed from 4/19, routing to Priscilla Martinez as CARLOS.        Maddison SETHI    [facility]

## 2017-04-21 ENCOUNTER — RECORDS - HEALTHEAST (OUTPATIENT)
Dept: ADMINISTRATIVE | Facility: OTHER | Age: 47
End: 2017-04-21

## 2017-04-21 ENCOUNTER — TRANSFERRED RECORDS (OUTPATIENT)
Dept: HEALTH INFORMATION MANAGEMENT | Facility: CLINIC | Age: 47
End: 2017-04-21

## 2017-04-21 NOTE — TELEPHONE ENCOUNTER
Faxed completed PA form and supporting documentation for LRFA to [employer] [facility]. RIGHT FAX CONFIRMATION 4/21 AT  1:22 PM.        Maddison SETHI    [facility]

## 2017-04-26 NOTE — TELEPHONE ENCOUNTER
Maria M from [employer] calling, she is needing documentation that the patient has demonstrated an improvement in functional status and decreased use of analgesics since previous RFA. Routing to provider to review and document and further information, office note from 4/19 was submitted with initial PA.       When submitting this, Maria M is also needing a specific provider that will be doing the LRFA. Previous provider is no longer with [facility].        Maddison SETHI    [facility]

## 2017-04-27 NOTE — TELEPHONE ENCOUNTER
I am out of town for a few days, will address this next week when I return to the clinic.    Priscilla KRISHNAN RN CNP, FNP  [place] [facility] [place]

## 2017-05-03 ENCOUNTER — RECORDS - HEALTHEAST (OUTPATIENT)
Dept: ADMINISTRATIVE | Facility: OTHER | Age: 47
End: 2017-05-03

## 2017-05-05 ENCOUNTER — TRANSFERRED RECORDS (OUTPATIENT)
Dept: HEALTH INFORMATION MANAGEMENT | Facility: CLINIC | Age: 47
End: 2017-05-05

## 2017-05-05 NOTE — TELEPHONE ENCOUNTER
Maria M from [employer] left VM 5/5 at 7:34 am    PA denied.  Reason given:      Patient does not meet PA criteria.  Patient notified:      If an appeal would like to be done, use reference number [id number].    Appeals department:  Cindy  Fax - [phone].    Routing to Priscilla to review and advise      Maddison SETHI    [facility]

## 2017-05-22 ENCOUNTER — RECORDS - HEALTHEAST (OUTPATIENT)
Dept: ADMINISTRATIVE | Facility: OTHER | Age: 47
End: 2017-05-22

## 2017-05-22 ENCOUNTER — TRANSFERRED RECORDS (OUTPATIENT)
Dept: HEALTH INFORMATION MANAGEMENT | Facility: CLINIC | Age: 47
End: 2017-05-22

## 2017-05-22 ENCOUNTER — RADIANT APPOINTMENT (OUTPATIENT)
Dept: MRI IMAGING | Facility: CLINIC | Age: 47
End: 2017-05-22
Attending: ANESTHESIOLOGY
Payer: COMMERCIAL

## 2017-05-22 DIAGNOSIS — M54.16 RADICULOPATHY, LUMBAR REGION: ICD-10-CM

## 2017-05-22 DIAGNOSIS — M47.26 OTHER SPONDYLOSIS WITH RADICULOPATHY, LUMBAR REGION: ICD-10-CM

## 2017-05-22 DIAGNOSIS — M48.061 SPINAL STENOSIS, LUMBAR REGION: ICD-10-CM

## 2017-05-22 DIAGNOSIS — M54.50 LOW BACK PAIN: ICD-10-CM

## 2017-05-22 PROCEDURE — 72148 MRI LUMBAR SPINE W/O DYE: CPT | Mod: TC

## 2017-05-24 NOTE — TELEPHONE ENCOUNTER
Sending to Chika Florentino RN at [facility] [place] to review first. See below please.     Priscilla KRISHNAN, RN CNP, FNP  [facility] [place]

## 2017-06-07 NOTE — TELEPHONE ENCOUNTER
Need to contact patient to let him know this information to see if he wishes to proceed.    Priscilla KRISHNAN RN CNP, FNP  [place] [facility]

## 2017-06-07 NOTE — TELEPHONE ENCOUNTER
Called and L/M for Amria M with [employer] asking for a direct call back to see why this was denied.     Chika Florentino RN, [facility]  Pain Clinic Care Coordinator

## 2017-06-07 NOTE — TELEPHONE ENCOUNTER
Rcvd a call from Maria M - she is going to review the notes even more and then give me a call back.     Chika Florentino RN, [facility]  Pain Clinic Care Coordinator

## 2017-06-07 NOTE — TELEPHONE ENCOUNTER
Rcvd a call back from Maria M and she indicated that back in November 2016 there was a request for pt to have a repeat RFA and at that time it was reported to insurance that pt only had 50 % relief from his RFA. She stated that with that information already submitted it contradicted the information we submitted.     We would need to start all over with MBB's if pt meets criteria. Will send to provider.     Chika Florentino RN, [facility]  Pain Clinic Care Coordinator

## 2017-06-08 NOTE — TELEPHONE ENCOUNTER
Called pt and he stated that he doesn't want to start over he is pursuing his surgical options. Closing this encounter.     Chika Florentino RN, [facility]  Pain Clinic Care Coordinator

## 2017-06-12 ENCOUNTER — COMMUNICATION - HEALTHEAST (OUTPATIENT)
Dept: ONCOLOGY | Facility: HOSPITAL | Age: 47
End: 2017-06-12

## 2017-06-14 ENCOUNTER — OFFICE VISIT (OUTPATIENT)
Dept: FAMILY MEDICINE | Facility: CLINIC | Age: 47
End: 2017-06-14
Payer: COMMERCIAL

## 2017-06-14 ENCOUNTER — RADIANT APPOINTMENT (OUTPATIENT)
Dept: GENERAL RADIOLOGY | Facility: CLINIC | Age: 47
End: 2017-06-14
Attending: FAMILY MEDICINE
Payer: COMMERCIAL

## 2017-06-14 VITALS
HEART RATE: 93 BPM | TEMPERATURE: 98.7 F | DIASTOLIC BLOOD PRESSURE: 86 MMHG | HEIGHT: 69 IN | BODY MASS INDEX: 35.04 KG/M2 | WEIGHT: 236.6 LBS | SYSTOLIC BLOOD PRESSURE: 137 MMHG | OXYGEN SATURATION: 96 %

## 2017-06-14 DIAGNOSIS — Z01.818 PREOP GENERAL PHYSICAL EXAM: Primary | ICD-10-CM

## 2017-06-14 DIAGNOSIS — G47.33 OSA ON CPAP: ICD-10-CM

## 2017-06-14 DIAGNOSIS — M54.9 CHRONIC BACK PAIN GREATER THAN 3 MONTHS DURATION: ICD-10-CM

## 2017-06-14 DIAGNOSIS — E61.1 IRON DEFICIENCY: ICD-10-CM

## 2017-06-14 DIAGNOSIS — M51.369 DDD (DEGENERATIVE DISC DISEASE), LUMBAR: ICD-10-CM

## 2017-06-14 DIAGNOSIS — G89.29 CHRONIC BACK PAIN GREATER THAN 3 MONTHS DURATION: ICD-10-CM

## 2017-06-14 LAB
ALBUMIN SERPL-MCNC: 4.1 G/DL (ref 3.4–5)
ALP SERPL-CCNC: 95 U/L (ref 40–150)
ALT SERPL W P-5'-P-CCNC: 30 U/L (ref 0–70)
ANION GAP SERPL CALCULATED.3IONS-SCNC: 9 MMOL/L (ref 3–14)
APTT PPP: 27 SEC (ref 22–37)
AST SERPL W P-5'-P-CCNC: 16 U/L (ref 0–45)
BASOPHILS # BLD AUTO: 0 10E9/L (ref 0–0.2)
BASOPHILS NFR BLD AUTO: 0.5 %
BILIRUB SERPL-MCNC: 0.8 MG/DL (ref 0.2–1.3)
BUN SERPL-MCNC: 14 MG/DL (ref 7–30)
CALCIUM SERPL-MCNC: 8.8 MG/DL (ref 8.5–10.1)
CHLORIDE SERPL-SCNC: 109 MMOL/L (ref 94–109)
CO2 SERPL-SCNC: 25 MMOL/L (ref 20–32)
CREAT SERPL-MCNC: 0.92 MG/DL (ref 0.66–1.25)
DIFFERENTIAL METHOD BLD: ABNORMAL
EOSINOPHIL # BLD AUTO: 0 10E9/L (ref 0–0.7)
EOSINOPHIL NFR BLD AUTO: 0.7 %
ERYTHROCYTE [DISTWIDTH] IN BLOOD BY AUTOMATED COUNT: 20.2 % (ref 10–15)
GFR SERPL CREATININE-BSD FRML MDRD: 88 ML/MIN/1.7M2
GLUCOSE SERPL-MCNC: 93 MG/DL (ref 70–99)
HCT VFR BLD AUTO: 36.5 % (ref 40–53)
HGB BLD-MCNC: 11.2 G/DL (ref 13.3–17.7)
INR PPP: 0.99 (ref 0.86–1.14)
LYMPHOCYTES # BLD AUTO: 1.3 10E9/L (ref 0.8–5.3)
LYMPHOCYTES NFR BLD AUTO: 21.9 %
MCH RBC QN AUTO: 23.1 PG (ref 26.5–33)
MCHC RBC AUTO-ENTMCNC: 30.7 G/DL (ref 31.5–36.5)
MCV RBC AUTO: 75 FL (ref 78–100)
MONOCYTES # BLD AUTO: 0.6 10E9/L (ref 0–1.3)
MONOCYTES NFR BLD AUTO: 9.3 %
NEUTROPHILS # BLD AUTO: 4.1 10E9/L (ref 1.6–8.3)
NEUTROPHILS NFR BLD AUTO: 67.6 %
PLATELET # BLD AUTO: 317 10E9/L (ref 150–450)
POTASSIUM SERPL-SCNC: 3.8 MMOL/L (ref 3.4–5.3)
PROT SERPL-MCNC: 7.5 G/DL (ref 6.8–8.8)
RBC # BLD AUTO: 4.84 10E12/L (ref 4.4–5.9)
SODIUM SERPL-SCNC: 143 MMOL/L (ref 133–144)
WBC # BLD AUTO: 6 10E9/L (ref 4–11)

## 2017-06-14 PROCEDURE — 85610 PROTHROMBIN TIME: CPT | Performed by: FAMILY MEDICINE

## 2017-06-14 PROCEDURE — 80053 COMPREHEN METABOLIC PANEL: CPT | Performed by: FAMILY MEDICINE

## 2017-06-14 PROCEDURE — 85730 THROMBOPLASTIN TIME PARTIAL: CPT | Performed by: FAMILY MEDICINE

## 2017-06-14 PROCEDURE — 99214 OFFICE O/P EST MOD 30 MIN: CPT | Performed by: FAMILY MEDICINE

## 2017-06-14 PROCEDURE — 93000 ELECTROCARDIOGRAM COMPLETE: CPT | Performed by: FAMILY MEDICINE

## 2017-06-14 PROCEDURE — 71020 XR CHEST 2 VW: CPT

## 2017-06-14 PROCEDURE — 85025 COMPLETE CBC W/AUTO DIFF WBC: CPT | Performed by: FAMILY MEDICINE

## 2017-06-14 PROCEDURE — 36415 COLL VENOUS BLD VENIPUNCTURE: CPT | Performed by: FAMILY MEDICINE

## 2017-06-14 ASSESSMENT — ANXIETY QUESTIONNAIRES
7. FEELING AFRAID AS IF SOMETHING AWFUL MIGHT HAPPEN: NEARLY EVERY DAY
1. FEELING NERVOUS, ANXIOUS, OR ON EDGE: MORE THAN HALF THE DAYS
3. WORRYING TOO MUCH ABOUT DIFFERENT THINGS: MORE THAN HALF THE DAYS
2. NOT BEING ABLE TO STOP OR CONTROL WORRYING: MORE THAN HALF THE DAYS
IF YOU CHECKED OFF ANY PROBLEMS ON THIS QUESTIONNAIRE, HOW DIFFICULT HAVE THESE PROBLEMS MADE IT FOR YOU TO DO YOUR WORK, TAKE CARE OF THINGS AT HOME, OR GET ALONG WITH OTHER PEOPLE: VERY DIFFICULT
GAD7 TOTAL SCORE: 16
5. BEING SO RESTLESS THAT IT IS HARD TO SIT STILL: MORE THAN HALF THE DAYS
6. BECOMING EASILY ANNOYED OR IRRITABLE: MORE THAN HALF THE DAYS

## 2017-06-14 ASSESSMENT — PATIENT HEALTH QUESTIONNAIRE - PHQ9: 5. POOR APPETITE OR OVEREATING: NEARLY EVERY DAY

## 2017-06-14 NOTE — NURSING NOTE
"Chief Complaint   Patient presents with     Pre-Op Exam       Initial /86  Pulse 93  Temp 98.7  F (37.1  C) (Tympanic)  Ht 5' 9\" (1.753 m)  Wt 236 lb 9.6 oz (107.3 kg)  SpO2 96%  BMI 34.94 kg/m2 Estimated body mass index is 34.94 kg/(m^2) as calculated from the following:    Height as of this encounter: 5' 9\" (1.753 m).    Weight as of this encounter: 236 lb 9.6 oz (107.3 kg).  Medication Reconciliation: complete     Tatiana Castillo MA      "

## 2017-06-14 NOTE — PROGRESS NOTES
[facility]  [address]  [place] [address]  [phone]  Dept: [phone]    PRE-OP EVALUATION:  Today's date: 2017    Mando Houston (: 1970) presents for pre-operative evaluation assessment as requested by Dr. Bingham.  He requires evaluation and anesthesia risk assessment prior to undergoing surgery/procedure for treatment of low back pain .  Proposed procedure: Discectomy with neural decompressions and medial branch block of lumbar region.     Date of Surgery/ Procedure: 17  Time of Surgery/ Procedure: 4:00 PM  Hospital/Surgical Facility: [facility]  Fax number for surgical facility: [phone]  Primary Physician: Shelly Dickerson  Type of Anesthesia Anticipated: General    Patient has a Health Care Directive or Living Will:  NO    1. NO - Do you have a history of heart attack, stroke, stent, bypass or surgery on an artery in the head, neck, heart or legs?  2. YES - DO YOU EVER HAVE ANY PAIN OR DISCOMFORT IN YOUR CHEST?   3. NO - Do you have a history of  Heart Failure?  4. YES - ARE YOUR TROUBLED BY SHORTNESS OF BREATH WHEN WALKING ON THE LEVEL, UP A SLIGHT HILL OR AT NIGHT? SOB  5. NO - Do you currently have a cold, bronchitis or other respiratory infection?  6. YES - DO YOU HAVE A COUGH, SHORTNESS OF BREATH OR WHEEZING? SOB  7. NO - Do you sometimes get pains in the calves of your legs when you walk?  8. NO - Do you or anyone in your family have previous history of blood clots?  9. NO - Do you or does anyone in your family have a serious bleeding problem such as prolonged bleeding following surgeries or cuts?  10. YES - HAVE YOU EVER HAD PROBLEMS WITH ANEMIA OR BEEN TOLD TO TAKE IRON PILLS?   11. NO - Have you had any abnormal blood loss such as black, tarry or bloody stools, or abnormal vaginal bleeding?  12. YES - HAVE YOU EVER HAD A BLOOD TRANSFUSION? x2 months ago   13. NO - Have you or any of your relatives ever had problems  with anesthesia?  14. YES - DO YOU HAVE SLEEP APNEA, EXCESSIVE SNORING OR DAYTIME DROWSINESS? Sleep apnea  15. NO - Do you have any prosthetic heart valves?  16. NO - Do you have prosthetic joints?  17. NO - Is there any chance that you may be pregnant?      HPI:                                                      Brief HPI related to upcoming procedure:     47 year old pleasant male here for a pre-op exam  Reports a history of chronic low back pain due to degenerative lumbar disc disease that has failed outpatient conservative management to include meds, physical Therapy and multiple Epidural steroid injections.   Reports that he has met with Dr Marcello Bingham's team and would like to proceed with the Discectomy with neural decompressions and medial branch block of lumbar region.   Provider is based out of [place], [place] but plans to do procedure here in [place]. No medical records available to review.  Requesting pre-op tests to include Chest Xray; EKG; labs- CBC; CMP; PTT and INR.     Patient reports a history of anemia with a recent blood transfusion. Recently had a upper and lower endoscopy. Currently following with hematology for further evaluation.      See problem list for active medical problems.  Problems all longstanding and stable, except as noted/documented.  See ROS for pertinent symptoms related to these conditions.                                                                                                  .    MEDICAL HISTORY:                                                      Patient Active Problem List    Diagnosis Date Noted     GI bleed due to NSAIDs 03/26/2017     Priority: Medium     Lightheadedness 03/25/2017     Priority: Medium     Gastroesophageal reflux disease with esophagitis 03/25/2017     Priority: Medium     AMELIE (obstructive sleep apnea)- severe () 04/14/2015     Priority: Medium     Study Date: 4/9/2015- (236.1 lbs) Baseline oxygen saturation was 92.4%.  The lowest oxygen  saturation was 70.8%. 27 obstructive, 26 central, and 65 mixed apneas resulting in an Apnea index of 57.3 events per hour. Apnea/Hypopnea Index was 103.0 events per hour.  The REM AHI was 75.8.  The RDI was 103.  CPAP optimal pressure was 8.0 with an AHI of 12.5. Supine REM was seen at this pressure       Chronic neck pain      Priority: Medium     Iron deficiency anemia 03/04/2015     Priority: Medium     Grief reaction 01/07/2015     Priority: Medium     Alcohol abuse 01/07/2015     Priority: Medium     Problem list name updated by automated process. Provider to review       Chronic low back pain 01/05/2015     Priority: Medium     Esophageal reflux 01/05/2015     Priority: Medium     CARDIOVASCULAR SCREENING; LDL GOAL LESS THAN 160 10/31/2010     Priority: Medium     Obesity 03/25/2015     Priority: Low      Past Medical History:   Diagnosis Date     AR (allergic rhinitis)     CONJUNCTIVITIS     Degenerative disc disease at L5-S1 level      GERD (gastroesophageal reflux disease)      Iron deficiency     s/p blood transfusion in the process of workup     AMELIE on CPAP     infrequent use of CPAP     Past Surgical History:   Procedure Laterality Date     ESOPHAGOSCOPY, GASTROSCOPY, DUODENOSCOPY (EGD), COMBINED N/A 3/27/2017    Procedure: COMBINED ESOPHAGOSCOPY, GASTROSCOPY, DUODENOSCOPY (EGD), BIOPSY SINGLE OR MULTIPLE;  Surgeon: Len Schwarz MD;  Location: [facility]     PE TUBES  1970s    as child     SURGICAL HISTORY OF -   1990s    LIPOMA, CHEST     TONSILLECTOMY  1980     Current Outpatient Prescriptions   Medication Sig Dispense Refill     pantoprazole (PROTONIX) 40 MG EC tablet Take 1 tablet (40 mg) by mouth daily Take 30-60 minutes before a meal. (Patient not taking: Reported on 4/19/2017) 30 tablet 0     ferrous sulfate (IRON) 325 (65 FE) MG tablet Take 1 tablet (325 mg) by mouth 2 times daily (Patient not taking: Reported on 4/19/2017) 60 tablet 0     OTC products: None, except as noted above    No Known  "Allergies   Latex Allergy: NO    Social History   Substance Use Topics     Smoking status: Never Smoker     Smokeless tobacco: Never Used      Comment: non-smoking household     Alcohol use No     History   Drug Use No       REVIEW OF SYSTEMS:                                                    Constitutional, neuro, ENT, endocrine, pulmonary, cardiac, gastrointestinal, genitourinary, musculoskeletal, integument and psychiatric systems are negative, except as otherwise noted.    EXAM:                                                    /86  Pulse 93  Temp 98.7  F (37.1  C) (Tympanic)  Ht 5' 9\" (1.753 m)  Wt 236 lb 9.6 oz (107.3 kg)  SpO2 96%  BMI 34.94 kg/m2    GENERAL APPEARANCE: healthy, alert and no distress     EYES: EOMI,  PERRL     HENT: ear canals and TM's normal and nose and mouth without ulcers or lesions     NECK: no adenopathy, no asymmetry, masses, or scars and thyroid normal to palpation     RESP: lungs clear to auscultation - no rales, rhonchi or wheezes     CV: regular rates and rhythm, normal S1 S2, no S3 or S4 and no murmur, click or rub     ABDOMEN:  soft, nontender, no HSM or masses and bowel sounds normal     MS: extremities normal- no gross deformities noted, no evidence of inflammation in joints, FROM in all extremities.     SKIN: no suspicious lesions or rashes     NEURO: Normal strength and tone, sensory exam grossly normal, mentation intact and speech normal     PSYCH: mentation appears normal. and affect normal/bright     LYMPHATICS: No axillary, cervical, or supraclavicular nodes    DIAGNOSTICS:                                                      EKG: appears normal, NSR, normal axis, normal intervals, no acute ST/T changes c/w ischemia, no LVH by voltage criteria, unchanged from previous tracings  Labs Drawn and in Process:   Unresulted Labs Ordered in the Past 30 Days of this Admission     No orders found from 4/15/2017 to 6/15/2017.          Recent Labs   Lab Test  03/27/17   " 0628  03/26/17   1620  03/26/17   0642  03/26/17   0020   03/25/17   1200   01/15/15   1838   HGB  9.6*  9.4*  7.9*   --    < >  6.8*   < >  11.1*   PLT   --    --   272   --    --   310   < >  275   NA   --    --    --    --    --   141   --   138   POTASSIUM   --    --    --   4.0   --   3.2*   --   3.5   CR   --    --    --    --    --   0.99   --   0.92    < > = values in this interval not displayed.        IMPRESSION:                                                    Reason for surgery/procedure: Discectomy with neural decompressions and medial branch block of lumbar region.   Diagnosis/reason for consult: Chronic low back pain due to degenerative lumbar disc disease.    The proposed surgical procedure is considered INTERMEDIATE risk.    REVISED CARDIAC RISK INDEX  The patient has the following serious cardiovascular risks for perioperative complications such as (MI, PE, VFib and 3  AV Block):  No serious cardiac risks  INTERPRETATION: 0 risks: Class I (very low risk - 0.4% complication rate)    The patient has the following additional risks for perioperative complications:  No identified additional risks      ICD-10-CM    1. Preop general physical exam Z01.818 EKG 12-lead complete w/read - Clinics     XR Chest 2 Views     CBC with platelets and differential     Comprehensive metabolic panel     INR     Partial thromboplastin time   2. DDD (degenerative disc disease), lumbar M51.36    3. Chronic back pain greater than 3 months duration M54.9     G89.29    4. Iron deficiency E61.1    5. AMELIE on CPAP G47.33        RECOMMENDATIONS:                                                        Cardiovascular Risk  None identified      Pulmonary Risk  None identified.      Obstructive Sleep Apnea (or suspected sleep apnea)  Patient is to bring their home CPAP with them on the day of surgery  Patient is clearly advised to use their home CPAP when released from surgery      Anemia  Does not require treatment prior to  surgery.  Monitor Hemoglobin postoperatively.      --Patient is to take all scheduled medications on the day of surgery EXCEPT for modifications listed below.    APPROVAL GIVEN to proceed with proposed procedure, without further diagnostic evaluation       Signed Electronically by: Lady Laguerre MD    Copy of this evaluation report is provided to requesting physician.    [facility] Preop Guidelines

## 2017-06-14 NOTE — LETTER
[facility]  [address] [place] [address] [phone]        June 20, 2017      Mando Houston  [address] [place] [place] [address]        Dear Mando,      Your pre-op labs and chest X ray looked good.   Anemia is improving.   Wish you the best with the upcoming procedure.     Results for orders placed or performed in visit on 06/14/17   XR Chest 2 Views    Narrative    XR CHEST 2 VW 6/14/2017 4:40 PM     HISTORY: Encounter for other preprocedural examination      Impression    IMPRESSION: Negative exam.    SHAUNNA MELLO MD   CBC with platelets and differential   Result Value Ref Range    WBC 6.0 4.0 - 11.0 10e9/L    RBC Count 4.84 4.4 - 5.9 10e12/L    Hemoglobin 11.2 (L) 13.3 - 17.7 g/dL    Hematocrit 36.5 (L) 40.0 - 53.0 %    MCV 75 (L) 78 - 100 fl    MCH 23.1 (L) 26.5 - 33.0 pg    MCHC 30.7 (L) 31.5 - 36.5 g/dL    RDW 20.2 (H) 10.0 - 15.0 %    Platelet Count 317 150 - 450 10e9/L    Diff Method Automated Method     % Neutrophils 67.6 %    % Lymphocytes 21.9 %    % Monocytes 9.3 %    % Eosinophils 0.7 %    % Basophils 0.5 %    Absolute Neutrophil 4.1 1.6 - 8.3 10e9/L    Absolute Lymphocytes 1.3 0.8 - 5.3 10e9/L    Absolute Monocytes 0.6 0.0 - 1.3 10e9/L    Absolute Eosinophils 0.0 0.0 - 0.7 10e9/L    Absolute Basophils 0.0 0.0 - 0.2 10e9/L   Comprehensive metabolic panel   Result Value Ref Range    Sodium 143 133 - 144 mmol/L    Potassium 3.8 3.4 - 5.3 mmol/L    Chloride 109 94 - 109 mmol/L    Carbon Dioxide 25 20 - 32 mmol/L    Anion Gap 9 3 - 14 mmol/L    Glucose 93 70 - 99 mg/dL    Urea Nitrogen 14 7 - 30 mg/dL    Creatinine 0.92 0.66 - 1.25 mg/dL    GFR Estimate 88 >60 mL/min/1.7m2    GFR Estimate If Black >90   GFR Calc   >60 mL/min/1.7m2    Calcium 8.8 8.5 - 10.1 mg/dL    Bilirubin Total 0.8 0.2 - 1.3 mg/dL    Albumin 4.1 3.4 - 5.0 g/dL    Protein Total 7.5 6.8 - 8.8 g/dL    Alkaline Phosphatase 95 40 - 150 U/L    ALT 30 0 - 70 U/L    AST 16 0 - 45 U/L    INR   Result Value Ref Range    INR 0.99 0.86 - 1.14   Partial thromboplastin time   Result Value Ref Range    PTT 27 22 - 37 sec     If you have any questions or concerns, please call myself or my nurse at [phone].    Sincerely,    Lady Laguerre M.D/scott

## 2017-06-14 NOTE — MR AVS SNAPSHOT
After Visit Summary   6/14/2017    Mando Houston    MRN: [id number]           Patient Information     Date Of Birth          1970        Visit Information        Provider Department      6/14/2017 3:30 PM Lady Laguerre MD [facility]        Today's Diagnoses     Preop general physical exam    -  1    DDD (degenerative disc disease), lumbar        Chronic back pain greater than 3 months duration        Iron deficiency        AMELIE on CPAP          Care Instructions      Before Your Surgery      Call your surgeon if there is any change in your health. This includes signs of a cold or flu (such as a sore throat, runny nose, cough, rash or fever).    Do not smoke, drink alcohol or take over the counter medicine (unless your surgeon or primary care doctor tells you to) for the 24 hours before and after surgery.    If you take prescribed drugs: Follow your doctor s orders about which medicines to take and which to stop until after surgery.    Eating and drinking prior to surgery: follow the instructions from your surgeon    Take a shower or bath the night before surgery. Use the soap your surgeon gave you to gently clean your skin. If you do not have soap from your surgeon, use your regular soap. Do not shave or scrub the surgery site.  Wear clean pajamas and have clean sheets on your bed.           Follow-ups after your visit        Who to contact     Normal or non-critical lab and imaging results will be communicated to you by Md7hart, letter or phone within 4 business days after the clinic has received the results. If you do not hear from us within 7 days, please contact the clinic through Md7hart or phone. If you have a critical or abnormal lab result, we will notify you by phone as soon as possible.  Submit refill requests through [employer] or call your pharmacy and they will forward the refill request to us. Please allow 3 business days for your refill to be completed.          If you  "need to speak with a  for additional information , please call: [phone]             Additional Information About Your Visit        [employer] Information     [employer] lets you send messages to your doctor, view your test results, renew your prescriptions, schedule appointments and more. To sign up, go to [email or web address] . Click on \"Log in\" on the left side of the screen, which will take you to the Welcome page. Then click on \"Sign up Now\" on the right side of the page.     You will be asked to enter the access code listed below, as well as some personal information. Please follow the directions to create your username and password.     Your access code is: [email or web address]  Expires: 2017  4:29 PM     Your access code will  in 90 days. If you need help or a new code, please call your [facility] clinic or [phone].        Care EveryWhere ID     This is your [facility] EveryWhere ID. This could be used by other organizations to access your [facility] medical records  [id number]        Your Vitals Were     Pulse Temperature Height Pulse Oximetry BMI (Body Mass Index)       93 98.7  F (37.1  C) (Tympanic) 5' 9\" (1.753 m) 96% 34.94 kg/m2        Blood Pressure from Last 3 Encounters:   17 137/86   17 138/84   17 118/78    Weight from Last 3 Encounters:   17 236 lb 9.6 oz (107.3 kg)   17 236 lb (107 kg)   17 236 lb (107 kg)              We Performed the Following     CBC with platelets and differential     Comprehensive metabolic panel     EKG 12-lead complete w/read - Clinics     INR     Partial thromboplastin time     XR Chest 2 Views        Primary Care Provider Office Phone # Fax #    Shelly Dickerson [phone]       [facility] [address] [facility] [address]        Thank you!     Thank you for choosing [facility]  for your care. Our goal is always to provide you with excellent care. Hearing " back from our patients is one way we can continue to improve our services. Please take a few minutes to complete the written survey that you may receive in the mail after your visit with us. Thank you!             Your Updated Medication List - Protect others around you: Learn how to safely use, store and throw away your medicines at www.disposemymeds.org.          This list is accurate as of: 6/14/17  4:18 PM.  Always use your most recent med list.                   Brand Name Dispense Instructions for use    ferrous sulfate 325 (65 FE) MG tablet    IRON    60 tablet    Take 1 tablet (325 mg) by mouth 2 times daily       pantoprazole 40 MG EC tablet    PROTONIX    30 tablet    Take 1 tablet (40 mg) by mouth daily Take 30-60 minutes before a meal.

## 2017-06-15 ASSESSMENT — PATIENT HEALTH QUESTIONNAIRE - PHQ9: SUM OF ALL RESPONSES TO PHQ QUESTIONS 1-9: 21

## 2017-06-15 ASSESSMENT — ANXIETY QUESTIONNAIRES: GAD7 TOTAL SCORE: 16

## 2017-06-16 ENCOUNTER — TELEPHONE (OUTPATIENT)
Dept: FAMILY MEDICINE | Facility: CLINIC | Age: 47
End: 2017-06-16

## 2017-06-16 NOTE — TELEPHONE ENCOUNTER
Received request from [facility] pain clinic for patients recent labs. Spoke with patient and informed him an KATRIN needs to be signed. Patient stated he will stop in sometime today and sign the form. Once sign form is received records will be faxed.    Glendy Ferguson CMA

## 2017-06-19 PROBLEM — R07.9 ACUTE CHEST PAIN: Status: RESOLVED | Noted: 2017-03-25 | Resolved: 2017-06-19

## 2017-06-19 PROBLEM — E87.6 HYPOKALEMIA: Status: RESOLVED | Noted: 2017-03-25 | Resolved: 2017-06-19

## 2017-06-26 ENCOUNTER — TRANSFERRED RECORDS (OUTPATIENT)
Dept: HEALTH INFORMATION MANAGEMENT | Facility: CLINIC | Age: 47
End: 2017-06-26

## 2017-06-26 ENCOUNTER — AMBULATORY - HEALTHEAST (OUTPATIENT)
Dept: INFUSION THERAPY | Facility: HOSPITAL | Age: 47
End: 2017-06-26

## 2017-06-26 DIAGNOSIS — D50.9 IRON DEFICIENCY ANEMIA: ICD-10-CM

## 2017-06-26 ASSESSMENT — MIFFLIN-ST. JEOR: SCORE: 1924.26

## 2017-08-18 ENCOUNTER — COMMUNICATION - HEALTHEAST (OUTPATIENT)
Dept: ADMINISTRATIVE | Facility: HOSPITAL | Age: 47
End: 2017-08-18

## 2017-08-22 ENCOUNTER — AMBULATORY - HEALTHEAST (OUTPATIENT)
Dept: INFUSION THERAPY | Facility: HOSPITAL | Age: 47
End: 2017-08-22

## 2017-08-22 ENCOUNTER — TRANSFERRED RECORDS (OUTPATIENT)
Dept: HEALTH INFORMATION MANAGEMENT | Facility: CLINIC | Age: 47
End: 2017-08-22

## 2017-08-22 DIAGNOSIS — D50.9 IRON DEFICIENCY ANEMIA: ICD-10-CM

## 2017-08-31 ENCOUNTER — TRANSFERRED RECORDS (OUTPATIENT)
Dept: HEALTH INFORMATION MANAGEMENT | Facility: CLINIC | Age: 47
End: 2017-08-31

## 2018-03-01 ENCOUNTER — TRANSFERRED RECORDS (OUTPATIENT)
Dept: HEALTH INFORMATION MANAGEMENT | Facility: CLINIC | Age: 48
End: 2018-03-01

## 2019-03-26 ENCOUNTER — TRANSFERRED RECORDS (OUTPATIENT)
Dept: HEALTH INFORMATION MANAGEMENT | Facility: CLINIC | Age: 49
End: 2019-03-26

## 2019-09-27 ENCOUNTER — TRANSFERRED RECORDS (OUTPATIENT)
Dept: HEALTH INFORMATION MANAGEMENT | Facility: CLINIC | Age: 49
End: 2019-09-27

## 2021-05-31 VITALS — BODY MASS INDEX: 35.49 KG/M2 | HEIGHT: 69 IN | WEIGHT: 239.6 LBS

## 2021-05-31 VITALS — WEIGHT: 243.9 LBS | BODY MASS INDEX: 36.55 KG/M2
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
## (undated) RX ORDER — GLYCOPYRROLATE 0.2 MG/ML
INJECTION, SOLUTION INTRAMUSCULAR; INTRAVENOUS
Status: DISPENSED
Start: 2017-03-27

## (undated) RX ORDER — LIDOCAINE HYDROCHLORIDE 10 MG/ML
INJECTION, SOLUTION EPIDURAL; INFILTRATION; INTRACAUDAL; PERINEURAL
Status: DISPENSED
Start: 2017-03-27